# Patient Record
Sex: FEMALE | Race: WHITE | Employment: OTHER | URBAN - METROPOLITAN AREA
[De-identification: names, ages, dates, MRNs, and addresses within clinical notes are randomized per-mention and may not be internally consistent; named-entity substitution may affect disease eponyms.]

---

## 2017-04-17 PROBLEM — J45.20 MILD INTERMITTENT ASTHMA WITHOUT COMPLICATION (HCC): Status: ACTIVE | Noted: 2017-04-17

## 2017-04-17 PROBLEM — J45.20 MILD INTERMITTENT ASTHMA WITHOUT COMPLICATION: Status: ACTIVE | Noted: 2017-04-17

## 2017-05-15 PROCEDURE — 87086 URINE CULTURE/COLONY COUNT: CPT | Performed by: INTERNAL MEDICINE

## 2017-06-22 PROBLEM — K58.9 IRRITABLE BOWEL SYNDROME WITHOUT DIARRHEA: Status: ACTIVE | Noted: 2017-06-22

## 2017-12-19 PROBLEM — Z13.820 SCREENING FOR OSTEOPOROSIS: Status: ACTIVE | Noted: 2017-12-19

## 2017-12-29 PROBLEM — M54.16 LUMBAR RADICULOPATHY: Status: ACTIVE | Noted: 2017-12-29

## 2017-12-29 PROBLEM — G89.29 CHRONIC LEFT HIP PAIN: Status: ACTIVE | Noted: 2017-12-29

## 2017-12-29 PROBLEM — M25.552 CHRONIC LEFT HIP PAIN: Status: ACTIVE | Noted: 2017-12-29

## 2017-12-29 PROBLEM — M54.2 CERVICALGIA: Status: ACTIVE | Noted: 2017-12-29

## 2018-05-17 PROCEDURE — 88305 TISSUE EXAM BY PATHOLOGIST: CPT | Performed by: RADIOLOGY

## 2019-07-25 PROCEDURE — 88305 TISSUE EXAM BY PATHOLOGIST: CPT | Performed by: INTERNAL MEDICINE

## 2019-08-21 PROBLEM — G89.29 CHRONIC LEFT HIP PAIN: Status: RESOLVED | Noted: 2017-12-29 | Resolved: 2019-08-21

## 2019-08-21 PROBLEM — M54.2 CERVICALGIA: Status: RESOLVED | Noted: 2017-12-29 | Resolved: 2019-08-21

## 2019-08-21 PROBLEM — M25.552 CHRONIC LEFT HIP PAIN: Status: RESOLVED | Noted: 2017-12-29 | Resolved: 2019-08-21

## 2019-08-21 PROBLEM — J45.20 MILD INTERMITTENT ASTHMA WITHOUT COMPLICATION (HCC): Status: RESOLVED | Noted: 2017-04-17 | Resolved: 2019-08-21

## 2019-08-21 PROBLEM — K58.9 IRRITABLE BOWEL SYNDROME WITHOUT DIARRHEA: Status: RESOLVED | Noted: 2017-06-22 | Resolved: 2019-08-21

## 2019-08-21 PROBLEM — J45.20 MILD INTERMITTENT ASTHMA WITHOUT COMPLICATION: Status: RESOLVED | Noted: 2017-04-17 | Resolved: 2019-08-21

## 2019-10-25 PROBLEM — H90.3 SENSORINEURAL HEARING LOSS (SNHL), BILATERAL: Status: ACTIVE | Noted: 2019-10-25

## 2020-02-25 PROBLEM — M47.816 ARTHRITIS OF FACET JOINT OF LUMBAR SPINE: Status: ACTIVE | Noted: 2020-02-25

## 2020-02-25 PROBLEM — I70.0 AORTIC ATHEROSCLEROSIS (HCC): Status: ACTIVE | Noted: 2020-02-25

## 2020-02-25 PROBLEM — I70.0 AORTIC ATHEROSCLEROSIS: Status: ACTIVE | Noted: 2020-02-25

## 2020-02-25 PROBLEM — M24.28 LIGAMENTUM FLAVUM HYPERTROPHY: Status: ACTIVE | Noted: 2020-02-25

## 2020-02-25 PROBLEM — M54.16 LUMBAR RADICULOPATHY: Status: RESOLVED | Noted: 2017-12-29 | Resolved: 2020-02-25

## 2021-02-12 ENCOUNTER — APPOINTMENT (OUTPATIENT)
Dept: GENERAL RADIOLOGY | Age: 76
DRG: 336 | End: 2021-02-12
Attending: EMERGENCY MEDICINE
Payer: MEDICARE

## 2021-02-12 ENCOUNTER — APPOINTMENT (OUTPATIENT)
Dept: CT IMAGING | Age: 76
DRG: 336 | End: 2021-02-12
Attending: PHYSICIAN ASSISTANT
Payer: MEDICARE

## 2021-02-12 ENCOUNTER — HOSPITAL ENCOUNTER (INPATIENT)
Facility: HOSPITAL | Age: 76
LOS: 6 days | Discharge: HOME | DRG: 336 | End: 2021-02-18
Attending: EMERGENCY MEDICINE | Admitting: HOSPITALIST
Payer: MEDICARE

## 2021-02-12 DIAGNOSIS — R11.2 INTRACTABLE VOMITING WITH NAUSEA, UNSPECIFIED VOMITING TYPE: ICD-10-CM

## 2021-02-12 DIAGNOSIS — E86.0 DEHYDRATION: ICD-10-CM

## 2021-02-12 DIAGNOSIS — E87.1 HYPONATREMIA: ICD-10-CM

## 2021-02-12 DIAGNOSIS — R73.9 HYPERGLYCEMIA: ICD-10-CM

## 2021-02-12 DIAGNOSIS — K56.609 SMALL BOWEL OBSTRUCTION (HCC): Primary | ICD-10-CM

## 2021-02-12 LAB
ALBUMIN SERPL-MCNC: 4.1 G/DL (ref 3.4–5)
ALBUMIN/GLOB SERPL: 1.1 {RATIO} (ref 1–2)
ALP LIVER SERPL-CCNC: 67 U/L
ALT SERPL-CCNC: 21 U/L
ANION GAP SERPL CALC-SCNC: 9 MMOL/L (ref 0–18)
AST SERPL-CCNC: 25 U/L (ref 15–37)
ATRIAL RATE: 94 BPM
BASOPHILS # BLD AUTO: 0.03 X10(3) UL (ref 0–0.2)
BASOPHILS NFR BLD AUTO: 0.3 %
BILIRUB SERPL-MCNC: 0.6 MG/DL (ref 0.1–2)
BUN BLD-MCNC: 18 MG/DL (ref 7–18)
BUN/CREAT SERPL: 17.5 (ref 10–20)
CALCIUM BLD-MCNC: 10 MG/DL (ref 8.5–10.1)
CHLORIDE SERPL-SCNC: 98 MMOL/L (ref 98–112)
CLARITY UR REFRACT.AUTO: CLEAR
CO2 SERPL-SCNC: 25 MMOL/L (ref 21–32)
COLOR UR AUTO: YELLOW
CREAT BLD-MCNC: 1.03 MG/DL
DEPRECATED RDW RBC AUTO: 39.9 FL (ref 35.1–46.3)
EOSINOPHIL # BLD AUTO: 0.02 X10(3) UL (ref 0–0.7)
EOSINOPHIL NFR BLD AUTO: 0.2 %
ERYTHROCYTE [DISTWIDTH] IN BLOOD BY AUTOMATED COUNT: 12.3 % (ref 11–15)
GLOBULIN PLAS-MCNC: 3.8 G/DL (ref 2.8–4.4)
GLUCOSE BLD-MCNC: 130 MG/DL (ref 70–99)
GLUCOSE BLD-MCNC: 97 MG/DL (ref 70–99)
GLUCOSE UR STRIP.AUTO-MCNC: NEGATIVE MG/DL
HCT VFR BLD AUTO: 41.9 %
HGB BLD-MCNC: 14.2 G/DL
IMM GRANULOCYTES # BLD AUTO: 0.03 X10(3) UL (ref 0–1)
IMM GRANULOCYTES NFR BLD: 0.3 %
KETONES UR STRIP.AUTO-MCNC: >=160 MG/DL
LEUKOCYTE ESTERASE UR QL STRIP.AUTO: NEGATIVE
LIPASE SERPL-CCNC: 225 U/L (ref 73–393)
LYMPHOCYTES # BLD AUTO: 1.15 X10(3) UL (ref 1–4)
LYMPHOCYTES NFR BLD AUTO: 10.1 %
M PROTEIN MFR SERPL ELPH: 7.9 G/DL (ref 6.4–8.2)
MCH RBC QN AUTO: 30.1 PG (ref 26–34)
MCHC RBC AUTO-ENTMCNC: 33.9 G/DL (ref 31–37)
MCV RBC AUTO: 88.8 FL
MONOCYTES # BLD AUTO: 0.65 X10(3) UL (ref 0.1–1)
MONOCYTES NFR BLD AUTO: 5.7 %
NEUTROPHILS # BLD AUTO: 9.49 X10 (3) UL (ref 1.5–7.7)
NEUTROPHILS # BLD AUTO: 9.49 X10(3) UL (ref 1.5–7.7)
NEUTROPHILS NFR BLD AUTO: 83.4 %
NITRITE UR QL STRIP.AUTO: NEGATIVE
OSMOLALITY SERPL CALC.SUM OF ELEC: 278 MOSM/KG (ref 275–295)
P AXIS: 71 DEGREES
P-R INTERVAL: 134 MS
PH UR STRIP.AUTO: 6 [PH] (ref 4.5–8)
PLATELET # BLD AUTO: 361 10(3)UL (ref 150–450)
POTASSIUM SERPL-SCNC: 3.8 MMOL/L (ref 3.5–5.1)
PROT UR STRIP.AUTO-MCNC: NEGATIVE MG/DL
Q-T INTERVAL: 360 MS
QRS DURATION: 80 MS
QTC CALCULATION (BEZET): 450 MS
R AXIS: 7 DEGREES
RBC # BLD AUTO: 4.72 X10(6)UL
RBC UR QL AUTO: NEGATIVE
SARS-COV-2 RNA RESP QL NAA+PROBE: NOT DETECTED
SODIUM SERPL-SCNC: 132 MMOL/L (ref 136–145)
SP GR UR STRIP.AUTO: 1.01 (ref 1–1.03)
T AXIS: 55 DEGREES
UROBILINOGEN UR STRIP.AUTO-MCNC: 0.2 MG/DL
VENTRICULAR RATE: 94 BPM
WBC # BLD AUTO: 11.4 X10(3) UL (ref 4–11)

## 2021-02-12 PROCEDURE — 96375 TX/PRO/DX INJ NEW DRUG ADDON: CPT

## 2021-02-12 PROCEDURE — 96376 TX/PRO/DX INJ SAME DRUG ADON: CPT

## 2021-02-12 PROCEDURE — 96374 THER/PROPH/DIAG INJ IV PUSH: CPT

## 2021-02-12 PROCEDURE — 99285 EMERGENCY DEPT VISIT HI MDM: CPT

## 2021-02-12 PROCEDURE — 71045 X-RAY EXAM CHEST 1 VIEW: CPT | Performed by: EMERGENCY MEDICINE

## 2021-02-12 PROCEDURE — 80053 COMPREHEN METABOLIC PANEL: CPT | Performed by: PHYSICIAN ASSISTANT

## 2021-02-12 PROCEDURE — 96361 HYDRATE IV INFUSION ADD-ON: CPT

## 2021-02-12 PROCEDURE — 93010 ELECTROCARDIOGRAM REPORT: CPT

## 2021-02-12 PROCEDURE — 85025 COMPLETE CBC W/AUTO DIFF WBC: CPT | Performed by: PHYSICIAN ASSISTANT

## 2021-02-12 PROCEDURE — 81003 URINALYSIS AUTO W/O SCOPE: CPT | Performed by: PHYSICIAN ASSISTANT

## 2021-02-12 PROCEDURE — S0028 INJECTION, FAMOTIDINE, 20 MG: HCPCS | Performed by: PHYSICIAN ASSISTANT

## 2021-02-12 PROCEDURE — 74177 CT ABD & PELVIS W/CONTRAST: CPT | Performed by: PHYSICIAN ASSISTANT

## 2021-02-12 PROCEDURE — 93005 ELECTROCARDIOGRAM TRACING: CPT

## 2021-02-12 PROCEDURE — 83690 ASSAY OF LIPASE: CPT | Performed by: PHYSICIAN ASSISTANT

## 2021-02-12 PROCEDURE — 82962 GLUCOSE BLOOD TEST: CPT

## 2021-02-12 RX ORDER — PANTOPRAZOLE SODIUM 20 MG/1
20 TABLET, DELAYED RELEASE ORAL
Refills: 3 | Status: DISCONTINUED | OUTPATIENT
Start: 2021-02-13 | End: 2021-02-14

## 2021-02-12 RX ORDER — HYDROMORPHONE HYDROCHLORIDE 1 MG/ML
0.5 INJECTION, SOLUTION INTRAMUSCULAR; INTRAVENOUS; SUBCUTANEOUS EVERY 30 MIN PRN
Status: ACTIVE | OUTPATIENT
Start: 2021-02-12 | End: 2021-02-12

## 2021-02-12 RX ORDER — ALBUTEROL SULFATE 90 UG/1
2 AEROSOL, METERED RESPIRATORY (INHALATION) EVERY 6 HOURS
Status: DISCONTINUED | OUTPATIENT
Start: 2021-02-12 | End: 2021-02-17

## 2021-02-12 RX ORDER — SODIUM CHLORIDE 9 MG/ML
INJECTION, SOLUTION INTRAVENOUS CONTINUOUS
Status: ACTIVE | OUTPATIENT
Start: 2021-02-12 | End: 2021-02-12

## 2021-02-12 RX ORDER — METOCLOPRAMIDE HYDROCHLORIDE 5 MG/ML
10 INJECTION INTRAMUSCULAR; INTRAVENOUS ONCE
Status: COMPLETED | OUTPATIENT
Start: 2021-02-12 | End: 2021-02-12

## 2021-02-12 RX ORDER — MORPHINE SULFATE 2 MG/ML
2 INJECTION, SOLUTION INTRAMUSCULAR; INTRAVENOUS EVERY 2 HOUR PRN
Status: DISCONTINUED | OUTPATIENT
Start: 2021-02-12 | End: 2021-02-18

## 2021-02-12 RX ORDER — DIPHENHYDRAMINE HYDROCHLORIDE 50 MG/ML
25 INJECTION INTRAMUSCULAR; INTRAVENOUS ONCE
Status: COMPLETED | OUTPATIENT
Start: 2021-02-12 | End: 2021-02-12

## 2021-02-12 RX ORDER — ONDANSETRON 2 MG/ML
4 INJECTION INTRAMUSCULAR; INTRAVENOUS ONCE
Status: COMPLETED | OUTPATIENT
Start: 2021-02-12 | End: 2021-02-12

## 2021-02-12 RX ORDER — METOCLOPRAMIDE HYDROCHLORIDE 5 MG/ML
5 INJECTION INTRAMUSCULAR; INTRAVENOUS EVERY 8 HOURS PRN
Status: DISCONTINUED | OUTPATIENT
Start: 2021-02-12 | End: 2021-02-18

## 2021-02-12 RX ORDER — ONDANSETRON 2 MG/ML
4 INJECTION INTRAMUSCULAR; INTRAVENOUS EVERY 4 HOURS PRN
Status: DISCONTINUED | OUTPATIENT
Start: 2021-02-12 | End: 2021-02-12

## 2021-02-12 RX ORDER — ONDANSETRON 2 MG/ML
4 INJECTION INTRAMUSCULAR; INTRAVENOUS EVERY 6 HOURS PRN
Status: DISCONTINUED | OUTPATIENT
Start: 2021-02-12 | End: 2021-02-18

## 2021-02-12 RX ORDER — FAMOTIDINE 10 MG/ML
20 INJECTION, SOLUTION INTRAVENOUS ONCE
Status: COMPLETED | OUTPATIENT
Start: 2021-02-12 | End: 2021-02-12

## 2021-02-12 RX ORDER — MONTELUKAST SODIUM 10 MG/1
10 TABLET ORAL EVERY EVENING
Status: DISCONTINUED | OUTPATIENT
Start: 2021-02-12 | End: 2021-02-18

## 2021-02-12 RX ORDER — SODIUM CHLORIDE 9 MG/ML
INJECTION, SOLUTION INTRAVENOUS CONTINUOUS
Status: DISCONTINUED | OUTPATIENT
Start: 2021-02-12 | End: 2021-02-14

## 2021-02-12 RX ORDER — MORPHINE SULFATE 4 MG/ML
4 INJECTION, SOLUTION INTRAMUSCULAR; INTRAVENOUS EVERY 2 HOUR PRN
Status: DISCONTINUED | OUTPATIENT
Start: 2021-02-12 | End: 2021-02-18

## 2021-02-12 RX ORDER — MORPHINE SULFATE 2 MG/ML
1 INJECTION, SOLUTION INTRAMUSCULAR; INTRAVENOUS EVERY 2 HOUR PRN
Status: DISCONTINUED | OUTPATIENT
Start: 2021-02-12 | End: 2021-02-18

## 2021-02-12 RX ORDER — MORPHINE SULFATE 4 MG/ML
4 INJECTION, SOLUTION INTRAMUSCULAR; INTRAVENOUS ONCE
Status: DISCONTINUED | OUTPATIENT
Start: 2021-02-12 | End: 2021-02-12

## 2021-02-12 RX ORDER — MORPHINE SULFATE 4 MG/ML
4 INJECTION, SOLUTION INTRAMUSCULAR; INTRAVENOUS ONCE
Status: COMPLETED | OUTPATIENT
Start: 2021-02-12 | End: 2021-02-12

## 2021-02-12 RX ORDER — HEPARIN SODIUM 5000 [USP'U]/ML
5000 INJECTION, SOLUTION INTRAVENOUS; SUBCUTANEOUS EVERY 8 HOURS SCHEDULED
Status: DISCONTINUED | OUTPATIENT
Start: 2021-02-12 | End: 2021-02-14

## 2021-02-12 NOTE — H&P
[facility] Hospitalist History and Physical      CC: Abd pain    PCP: Mimi Sultana MD    History of Present Illness: Patient is a 67year old female with PMH sig for HTN, GERD presented to ER with abd pain. CT consistent with SBO. NG placed.      Afebil every 6 (six) hours as needed for Wheezing., Disp: 1 Box, Rfl: 0    •  Albuterol Sulfate HFA (VENTOLIN HFA) 108 (90 Base) MCG/ACT Inhalation Aero Soln, TAKE 2 PUFFS EVERY 6 HOURS AS NEEDED FOR WHEEZING (Patient not taking: Reported on 8/25/2020 ), Disp: 1 25.0 02/12/2021     02/12/2021    CA 10.0 02/12/2021    ALB 4.1 02/12/2021    ALKPHO 67 02/12/2021    BILT 0.6 02/12/2021    TP 7.9 02/12/2021    AST 25 02/12/2021    ALT 21 02/12/2021     02/12/2021       Above labs reviewed.     Radiology:

## 2021-02-12 NOTE — PROGRESS NOTES
[facility] Pharmacy Note:  Renal Dose Adjustment for Metoclopramide (REGLAN)    Jazlyn Tran has been prescribed Metoclopramide (REGLAN) 10 mg every 8 hours as needed for n/v.    Estimated Creatinine Clearance: 36.2 mL/min (A) (based on SCr of 1.03 mg/dL (H)

## 2021-02-12 NOTE — PLAN OF CARE
NURSING ADMISSION NOTE      Patient admitted via Ambulance  Oriented to room. Safety precautions initiated. Bed in low position. Call light in reach. Pt brought in from [facility]. Admission navigator completed. Admission assessment done.

## 2021-02-12 NOTE — ED PROVIDER NOTES
14-year-old female who was seen by me as well as by the physician assistant with complaints of vomiting abdominal distention and bloating.   Patient was recently at [facility] secondary to severe abdominal pain that began after starting meloxicam

## 2021-02-12 NOTE — ED INITIAL ASSESSMENT (HPI)
C/o abd pain since  0330 Wed, admitted to [facility] for adverse reaction to meloxicam, emesis starting this am 0200

## 2021-02-12 NOTE — ED PROVIDER NOTES
Patient Seen in: [facility] Emergency Department In [place]      History   Patient presents with:  Nausea/Vomiting/Diarrhea  Abdomen/Flank Pain    Stated Complaint: vomiting, abdominal pain    HPI/Subjective:   HPI    CHIEF COMPLAINT: Vomiting and abdomin reviewed and is noncontributory to the presenting problem, except as indicated as above.     Objective:   Past Medical History:   Diagnosis Date   • Arthritis    • Esophageal reflux    • Extrinsic asthma, unspecified    • Hiatal hernia    • Unspecified esse Notable for the following components:       Result Value    Glucose 130 (*)     Sodium 132 (*)     Creatinine 1.03 (*)     GFR, Non- 53 (*)     All other components within normal limits   URINALYSIS WITH CULTURE REFLEX - Abnormal; Notable f transcribed by Technologist)  Mid abd pain with emesis for 3 days after taking new medication Meloxicam per patient. CONTRAST USED:  94cc of Omnipaque 350  FINDINGS:  LIVER:  No enlargement, atrophy, abnormal density, or significant focal lesion.   BILIAR showed a white count of 11,000. Lipase was normal.  Urinalysis showed ketones. She is given an IV fluid bolus, morphine, Pepcid and Zofran. She was sent for CT abdomen and pelvis.   CT abdomen and pelvis reveals a partial small bowel obstruction likely d

## 2021-02-13 ENCOUNTER — ANESTHESIA (OUTPATIENT)
Dept: SURGERY | Facility: HOSPITAL | Age: 76
DRG: 336 | End: 2021-02-13
Payer: MEDICARE

## 2021-02-13 ENCOUNTER — ANESTHESIA EVENT (OUTPATIENT)
Dept: SURGERY | Facility: HOSPITAL | Age: 76
DRG: 336 | End: 2021-02-13
Payer: MEDICARE

## 2021-02-13 LAB
ANION GAP SERPL CALC-SCNC: 9 MMOL/L (ref 0–18)
ANTIBODY SCREEN: NEGATIVE
BASOPHILS # BLD AUTO: 0.02 X10(3) UL (ref 0–0.2)
BASOPHILS NFR BLD AUTO: 0.4 %
BUN BLD-MCNC: 19 MG/DL (ref 7–18)
BUN/CREAT SERPL: 21.6 (ref 10–20)
CALCIUM BLD-MCNC: 8.2 MG/DL (ref 8.5–10.1)
CHLORIDE SERPL-SCNC: 106 MMOL/L (ref 98–112)
CO2 SERPL-SCNC: 27 MMOL/L (ref 21–32)
CREAT BLD-MCNC: 0.88 MG/DL
DEPRECATED RDW RBC AUTO: 43.5 FL (ref 35.1–46.3)
EOSINOPHIL # BLD AUTO: 0.06 X10(3) UL (ref 0–0.7)
EOSINOPHIL NFR BLD AUTO: 1.2 %
ERYTHROCYTE [DISTWIDTH] IN BLOOD BY AUTOMATED COUNT: 12.7 % (ref 11–15)
GLUCOSE BLD-MCNC: 81 MG/DL (ref 70–99)
GLUCOSE BLD-MCNC: 86 MG/DL (ref 70–99)
HCT VFR BLD AUTO: 35.6 %
HGB BLD-MCNC: 11.5 G/DL
IMM GRANULOCYTES # BLD AUTO: 0.01 X10(3) UL (ref 0–1)
IMM GRANULOCYTES NFR BLD: 0.2 %
LYMPHOCYTES # BLD AUTO: 1.16 X10(3) UL (ref 1–4)
LYMPHOCYTES NFR BLD AUTO: 22.6 %
MCH RBC QN AUTO: 30.2 PG (ref 26–34)
MCHC RBC AUTO-ENTMCNC: 32.3 G/DL (ref 31–37)
MCV RBC AUTO: 93.4 FL
MONOCYTES # BLD AUTO: 0.62 X10(3) UL (ref 0.1–1)
MONOCYTES NFR BLD AUTO: 12.1 %
NEUTROPHILS # BLD AUTO: 3.26 X10 (3) UL (ref 1.5–7.7)
NEUTROPHILS # BLD AUTO: 3.26 X10(3) UL (ref 1.5–7.7)
NEUTROPHILS NFR BLD AUTO: 63.5 %
OSMOLALITY SERPL CALC.SUM OF ELEC: 295 MOSM/KG (ref 275–295)
PLATELET # BLD AUTO: 280 10(3)UL (ref 150–450)
POTASSIUM SERPL-SCNC: 3.7 MMOL/L (ref 3.5–5.1)
RBC # BLD AUTO: 3.81 X10(6)UL
RH BLOOD TYPE: POSITIVE
SODIUM SERPL-SCNC: 142 MMOL/L (ref 136–145)
WBC # BLD AUTO: 5.1 X10(3) UL (ref 4–11)

## 2021-02-13 PROCEDURE — 86901 BLOOD TYPING SEROLOGIC RH(D): CPT | Performed by: SURGERY

## 2021-02-13 PROCEDURE — 0DN80ZZ RELEASE SMALL INTESTINE, OPEN APPROACH: ICD-10-PCS | Performed by: SURGERY

## 2021-02-13 PROCEDURE — 82962 GLUCOSE BLOOD TEST: CPT

## 2021-02-13 PROCEDURE — 80048 BASIC METABOLIC PNL TOTAL CA: CPT | Performed by: HOSPITALIST

## 2021-02-13 PROCEDURE — 86850 RBC ANTIBODY SCREEN: CPT | Performed by: SURGERY

## 2021-02-13 PROCEDURE — 85025 COMPLETE CBC W/AUTO DIFF WBC: CPT | Performed by: HOSPITALIST

## 2021-02-13 PROCEDURE — 86900 BLOOD TYPING SEROLOGIC ABO: CPT | Performed by: SURGERY

## 2021-02-13 PROCEDURE — S0028 INJECTION, FAMOTIDINE, 20 MG: HCPCS

## 2021-02-13 DEVICE — SEPRAFILM ADHESION BARRIER (MEMBRANE) IS A STERILE, BIORESORBABLE, TRANSLUCENT ADHESION BARRIER COMPOSED OF TWO ANIONIC POLYSACCHARIDES, SODIUM HYALURONATE (HA) AND CARBOXYMETHYLCELLULOSE (CMC).
Type: IMPLANTABLE DEVICE | Site: ABDOMEN | Status: FUNCTIONAL
Brand: SEPRAFILM

## 2021-02-13 RX ORDER — EPHEDRINE SULFATE 50 MG/ML
INJECTION INTRAVENOUS AS NEEDED
Status: DISCONTINUED | OUTPATIENT
Start: 2021-02-13 | End: 2021-02-13 | Stop reason: SURG

## 2021-02-13 RX ORDER — DEXAMETHASONE SODIUM PHOSPHATE 4 MG/ML
VIAL (ML) INJECTION AS NEEDED
Status: DISCONTINUED | OUTPATIENT
Start: 2021-02-13 | End: 2021-02-13 | Stop reason: SURG

## 2021-02-13 RX ORDER — HYDROMORPHONE HYDROCHLORIDE 1 MG/ML
0.2 INJECTION, SOLUTION INTRAMUSCULAR; INTRAVENOUS; SUBCUTANEOUS EVERY 2 HOUR PRN
Status: DISCONTINUED | OUTPATIENT
Start: 2021-02-13 | End: 2021-02-18

## 2021-02-13 RX ORDER — LIDOCAINE HYDROCHLORIDE 10 MG/ML
INJECTION, SOLUTION INFILTRATION; PERINEURAL AS NEEDED
Status: DISCONTINUED | OUTPATIENT
Start: 2021-02-13 | End: 2021-02-13 | Stop reason: HOSPADM

## 2021-02-13 RX ORDER — SODIUM CHLORIDE 9 MG/ML
INJECTION, SOLUTION INTRAVENOUS CONTINUOUS
Status: DISCONTINUED | OUTPATIENT
Start: 2021-02-13 | End: 2021-02-13 | Stop reason: HOSPADM

## 2021-02-13 RX ORDER — BACITRACIN 50000 [USP'U]/1
INJECTION, POWDER, LYOPHILIZED, FOR SOLUTION INTRAMUSCULAR AS NEEDED
Status: DISCONTINUED | OUTPATIENT
Start: 2021-02-13 | End: 2021-02-13 | Stop reason: HOSPADM

## 2021-02-13 RX ORDER — LIDOCAINE HYDROCHLORIDE 10 MG/ML
INJECTION, SOLUTION EPIDURAL; INFILTRATION; INTRACAUDAL; PERINEURAL AS NEEDED
Status: DISCONTINUED | OUTPATIENT
Start: 2021-02-13 | End: 2021-02-13 | Stop reason: SURG

## 2021-02-13 RX ORDER — LIDOCAINE HYDROCHLORIDE ANHYDROUS AND DEXTROSE MONOHYDRATE .8; 5 G/100ML; G/100ML
INJECTION, SOLUTION INTRAVENOUS CONTINUOUS PRN
Status: DISCONTINUED | OUTPATIENT
Start: 2021-02-13 | End: 2021-02-13 | Stop reason: SURG

## 2021-02-13 RX ORDER — LABETALOL HYDROCHLORIDE 5 MG/ML
5 INJECTION, SOLUTION INTRAVENOUS EVERY 5 MIN PRN
Status: DISCONTINUED | OUTPATIENT
Start: 2021-02-13 | End: 2021-02-13 | Stop reason: HOSPADM

## 2021-02-13 RX ORDER — BUPIVACAINE HYDROCHLORIDE 5 MG/ML
INJECTION, SOLUTION EPIDURAL; INTRACAUDAL AS NEEDED
Status: DISCONTINUED | OUTPATIENT
Start: 2021-02-13 | End: 2021-02-13 | Stop reason: HOSPADM

## 2021-02-13 RX ORDER — GLYCOPYRROLATE 0.2 MG/ML
INJECTION, SOLUTION INTRAMUSCULAR; INTRAVENOUS AS NEEDED
Status: DISCONTINUED | OUTPATIENT
Start: 2021-02-13 | End: 2021-02-13 | Stop reason: SURG

## 2021-02-13 RX ORDER — MEPERIDINE HYDROCHLORIDE 25 MG/ML
12.5 INJECTION INTRAMUSCULAR; INTRAVENOUS; SUBCUTANEOUS AS NEEDED
Status: DISCONTINUED | OUTPATIENT
Start: 2021-02-13 | End: 2021-02-13 | Stop reason: HOSPADM

## 2021-02-13 RX ORDER — CEFOXITIN 2 G/1
INJECTION, POWDER, FOR SOLUTION INTRAVENOUS AS NEEDED
Status: DISCONTINUED | OUTPATIENT
Start: 2021-02-13 | End: 2021-02-13 | Stop reason: SURG

## 2021-02-13 RX ORDER — FAMOTIDINE 10 MG/ML
INJECTION, SOLUTION INTRAVENOUS AS NEEDED
Status: DISCONTINUED | OUTPATIENT
Start: 2021-02-13 | End: 2021-02-13 | Stop reason: SURG

## 2021-02-13 RX ORDER — NALOXONE HYDROCHLORIDE 0.4 MG/ML
80 INJECTION, SOLUTION INTRAMUSCULAR; INTRAVENOUS; SUBCUTANEOUS AS NEEDED
Status: DISCONTINUED | OUTPATIENT
Start: 2021-02-13 | End: 2021-02-13 | Stop reason: HOSPADM

## 2021-02-13 RX ORDER — KETAMINE HYDROCHLORIDE 50 MG/ML
INJECTION, SOLUTION, CONCENTRATE INTRAMUSCULAR; INTRAVENOUS AS NEEDED
Status: DISCONTINUED | OUTPATIENT
Start: 2021-02-13 | End: 2021-02-13 | Stop reason: SURG

## 2021-02-13 RX ORDER — NEOSTIGMINE METHYLSULFATE 1 MG/ML
INJECTION INTRAVENOUS AS NEEDED
Status: DISCONTINUED | OUTPATIENT
Start: 2021-02-13 | End: 2021-02-13 | Stop reason: SURG

## 2021-02-13 RX ORDER — CISATRACURIUM BESYLATE 2 MG/ML
INJECTION INTRAVENOUS AS NEEDED
Status: DISCONTINUED | OUTPATIENT
Start: 2021-02-13 | End: 2021-02-13 | Stop reason: SURG

## 2021-02-13 RX ORDER — HYDROMORPHONE HYDROCHLORIDE 1 MG/ML
0.8 INJECTION, SOLUTION INTRAMUSCULAR; INTRAVENOUS; SUBCUTANEOUS EVERY 2 HOUR PRN
Status: DISCONTINUED | OUTPATIENT
Start: 2021-02-13 | End: 2021-02-18

## 2021-02-13 RX ORDER — KETOROLAC TROMETHAMINE 30 MG/ML
30 INJECTION, SOLUTION INTRAMUSCULAR; INTRAVENOUS EVERY 6 HOURS PRN
Status: DISCONTINUED | OUTPATIENT
Start: 2021-02-13 | End: 2021-02-14

## 2021-02-13 RX ORDER — HYDROMORPHONE HYDROCHLORIDE 1 MG/ML
0.4 INJECTION, SOLUTION INTRAMUSCULAR; INTRAVENOUS; SUBCUTANEOUS EVERY 2 HOUR PRN
Status: DISCONTINUED | OUTPATIENT
Start: 2021-02-13 | End: 2021-02-18

## 2021-02-13 RX ORDER — HYDROMORPHONE HYDROCHLORIDE 1 MG/ML
0.2 INJECTION, SOLUTION INTRAMUSCULAR; INTRAVENOUS; SUBCUTANEOUS EVERY 5 MIN PRN
Status: DISCONTINUED | OUTPATIENT
Start: 2021-02-13 | End: 2021-02-13 | Stop reason: HOSPADM

## 2021-02-13 RX ORDER — ONDANSETRON 2 MG/ML
4 INJECTION INTRAMUSCULAR; INTRAVENOUS AS NEEDED
Status: DISCONTINUED | OUTPATIENT
Start: 2021-02-13 | End: 2021-02-13 | Stop reason: HOSPADM

## 2021-02-13 RX ADMIN — ONDANSETRON 4 MG: 2 INJECTION INTRAMUSCULAR; INTRAVENOUS at 10:28:00

## 2021-02-13 RX ADMIN — FAMOTIDINE 20 MG: 10 INJECTION, SOLUTION INTRAVENOUS at 09:52:00

## 2021-02-13 RX ADMIN — LIDOCAINE HYDROCHLORIDE ANHYDROUS AND DEXTROSE MONOHYDRATE 2 MG/KG/HR: .8; 5 INJECTION, SOLUTION INTRAVENOUS at 09:50:00

## 2021-02-13 RX ADMIN — NEOSTIGMINE METHYLSULFATE 5 MG: 1 INJECTION INTRAVENOUS at 10:32:00

## 2021-02-13 RX ADMIN — CISATRACURIUM BESYLATE 4 MG: 2 INJECTION INTRAVENOUS at 10:00:00

## 2021-02-13 RX ADMIN — KETAMINE HYDROCHLORIDE 20 MG: 50 INJECTION, SOLUTION, CONCENTRATE INTRAMUSCULAR; INTRAVENOUS at 09:46:00

## 2021-02-13 RX ADMIN — GLYCOPYRROLATE 0.6 MG: 0.2 INJECTION, SOLUTION INTRAMUSCULAR; INTRAVENOUS at 10:32:00

## 2021-02-13 RX ADMIN — METOCLOPRAMIDE HYDROCHLORIDE 10 MG: 5 INJECTION INTRAMUSCULAR; INTRAVENOUS at 09:52:00

## 2021-02-13 RX ADMIN — EPHEDRINE SULFATE 10 MG: 50 INJECTION INTRAVENOUS at 10:12:00

## 2021-02-13 RX ADMIN — GLYCOPYRROLATE 0.2 MG: 0.2 INJECTION, SOLUTION INTRAMUSCULAR; INTRAVENOUS at 10:12:00

## 2021-02-13 RX ADMIN — EPHEDRINE SULFATE 10 MG: 50 INJECTION INTRAVENOUS at 10:11:00

## 2021-02-13 RX ADMIN — SODIUM CHLORIDE: 9 INJECTION, SOLUTION INTRAVENOUS at 10:46:00

## 2021-02-13 RX ADMIN — LIDOCAINE HYDROCHLORIDE 100 MG: 10 INJECTION, SOLUTION EPIDURAL; INFILTRATION; INTRACAUDAL; PERINEURAL at 09:45:00

## 2021-02-13 RX ADMIN — DEXAMETHASONE SODIUM PHOSPHATE 8 MG: 4 MG/ML VIAL (ML) INJECTION at 09:51:00

## 2021-02-13 RX ADMIN — CISATRACURIUM BESYLATE 2 MG: 2 INJECTION INTRAVENOUS at 09:46:00

## 2021-02-13 RX ADMIN — CEFOXITIN 2 G: 2 INJECTION, POWDER, FOR SOLUTION INTRAVENOUS at 09:54:00

## 2021-02-13 NOTE — BRIEF OP NOTE
Pre-Operative Diagnosis: Bowel obstruction ([address] [K56.609]     Post-Operative Diagnosis: Bowel obstruction ([address] [K56.609]      Procedure Performed:   Procedure(s):  EXPLORATORY LAPAROTOMY , LYSIS OF ADHESIONS    Surgeon(s) and Role:     Roseann Baca,

## 2021-02-13 NOTE — ANESTHESIA POSTPROCEDURE EVALUATION
[address] Patient Status:  Inpatient   Age/Gender 67year old female MRN [id number]   Location [address] Attending [facility] Day # 1 PCP MD Yoselyn Medel

## 2021-02-13 NOTE — ANESTHESIA PREPROCEDURE EVALUATION
PRE-OP EVALUATION    Patient Name: Liliana Sood    Pre-op Diagnosis: Bowel obstruction ([address] [C16.245]    Procedure(s):  EXPLORATORY LAPAROTOMY  POSS.  BOWEL RESECTION POSS. OSTOMY    Surgeon(s) and Role:     Inez Huddleston MD - Primary    Pre-op vi •  Metoclopramide HCl (REGLAN) injection 5 mg, 5 mg, Intravenous, Q8H PRN        Outpatient Medications:     •  Meloxicam 7.5 MG Oral Tab, Take 1 tablet (7.5 mg total) by mouth daily. , Disp: 30 tablet, Rfl: 0, 2/12/2021 at Unknown time    •  Montelukast So (-) history of anesthetic complications         GI/Hepatic/Renal  Comment: Bowel obstruction  Cr mildly elevated    (+) GERD   (+) hiatal hernia                        Cardiovascular      ECG reviewed.           (+) obesity  (+) hypertension   (+) hyperlipi NPO status verified and patient meets guidelines. Comment: General anesthesia risks, benefits and alternatives discussed.   Possible side effects/risks including but not limited to sore throat, oral or dental injury, eye injury, nausea/vomiting, adve

## 2021-02-13 NOTE — PLAN OF CARE
Pt A&Ox4 Room Air  Resting in bed  Meds given per Mar  Will hold morning Heparin. NGT to left iWlnere  NPO (SBO)  General surgery came and spoke to pt. Exploratory lap for SBO, Possible bowel resection  Consent signed on pt Chart.     Safety precaution main

## 2021-02-13 NOTE — ANESTHESIA PROCEDURE NOTES
Airway  Date/Time: 2/13/2021 9:47 AM  Urgency: elective      General Information and Staff    Patient location during procedure: OR  Anesthesiologist: Roger Aleman MD  Performed: anesthesiologist     Indications and Patient Condition  Indications for airway

## 2021-02-13 NOTE — ANESTHESIA PROCEDURE NOTES
Peripheral IV  Date/Time: 2/13/2021 9:50 AM  Inserted by: Elaine Goncalves MD    Placement  Needle size: 18 G  Laterality: left  Location: wrist  Local anesthetic: none  Site prep: alcohol  Technique: anatomical landmarks

## 2021-02-13 NOTE — CONSULTS
[facility]  Report of Consultation    Pat Susieisaias Patient Status:  Inpatient    1945 MRN [id number]   [facility] 3NE-A Attending Danielito Mosley,*   Hosp Day # 0 PCP Pierre Barron MD     [facility] SERVICES, [facility] Q6H  •  Montelukast Sodium (SINGULAIR) tab 10 mg, 10 mg, Oral, QPM  •  [START ON 2/13/2021] Pantoprazole Sodium (PROTONIX) EC tab 20 mg, 20 mg, Oral, QAM AC  •  0.9% NaCl infusion, , Intravenous, Continuous  •  Heparin Sodium (Porcine) 5000 UNIT/ML injecti turgor. Neurologic: Cranial nerves are grossly intact. Motor strength and sensory examination is grossly normal.  No focal neurologic deficit.     Laboratory Data:  Lab Results   Component Value Date    WBC 11.4 02/12/2021    HGB 14.2 02/12/2021    HCT 41

## 2021-02-14 LAB
ANION GAP SERPL CALC-SCNC: 9 MMOL/L (ref 0–18)
BASOPHILS # BLD AUTO: 0.01 X10(3) UL (ref 0–0.2)
BASOPHILS NFR BLD AUTO: 0.2 %
BUN BLD-MCNC: 18 MG/DL (ref 7–18)
BUN/CREAT SERPL: 17.6 (ref 10–20)
CALCIUM BLD-MCNC: 8.6 MG/DL (ref 8.5–10.1)
CHLORIDE SERPL-SCNC: 110 MMOL/L (ref 98–112)
CO2 SERPL-SCNC: 22 MMOL/L (ref 21–32)
CREAT BLD-MCNC: 1.02 MG/DL
DEPRECATED RDW RBC AUTO: 44 FL (ref 35.1–46.3)
EOSINOPHIL # BLD AUTO: 0 X10(3) UL (ref 0–0.7)
EOSINOPHIL NFR BLD AUTO: 0 %
ERYTHROCYTE [DISTWIDTH] IN BLOOD BY AUTOMATED COUNT: 12.8 % (ref 11–15)
GLUCOSE BLD-MCNC: 102 MG/DL (ref 70–99)
GLUCOSE BLD-MCNC: 103 MG/DL (ref 70–99)
GLUCOSE BLD-MCNC: 103 MG/DL (ref 70–99)
HCT VFR BLD AUTO: 33.8 %
HGB BLD-MCNC: 11 G/DL
IMM GRANULOCYTES # BLD AUTO: 0 X10(3) UL (ref 0–1)
IMM GRANULOCYTES NFR BLD: 0 %
LYMPHOCYTES # BLD AUTO: 0.87 X10(3) UL (ref 1–4)
LYMPHOCYTES NFR BLD AUTO: 16.8 %
MCH RBC QN AUTO: 30.4 PG (ref 26–34)
MCHC RBC AUTO-ENTMCNC: 32.5 G/DL (ref 31–37)
MCV RBC AUTO: 93.4 FL
MONOCYTES # BLD AUTO: 0.76 X10(3) UL (ref 0.1–1)
MONOCYTES NFR BLD AUTO: 14.6 %
NEUTROPHILS # BLD AUTO: 3.55 X10 (3) UL (ref 1.5–7.7)
NEUTROPHILS # BLD AUTO: 3.55 X10(3) UL (ref 1.5–7.7)
NEUTROPHILS NFR BLD AUTO: 68.4 %
OSMOLALITY SERPL CALC.SUM OF ELEC: 294 MOSM/KG (ref 275–295)
PLATELET # BLD AUTO: 261 10(3)UL (ref 150–450)
POTASSIUM SERPL-SCNC: 4.1 MMOL/L (ref 3.5–5.1)
POTASSIUM SERPL-SCNC: 4.1 MMOL/L (ref 3.5–5.1)
RBC # BLD AUTO: 3.62 X10(6)UL
SODIUM SERPL-SCNC: 141 MMOL/L (ref 136–145)
WBC # BLD AUTO: 5.2 X10(3) UL (ref 4–11)

## 2021-02-14 PROCEDURE — 80048 BASIC METABOLIC PNL TOTAL CA: CPT | Performed by: HOSPITALIST

## 2021-02-14 PROCEDURE — 84132 ASSAY OF SERUM POTASSIUM: CPT | Performed by: HOSPITALIST

## 2021-02-14 PROCEDURE — C9113 INJ PANTOPRAZOLE SODIUM, VIA: HCPCS | Performed by: HOSPITALIST

## 2021-02-14 PROCEDURE — 82962 GLUCOSE BLOOD TEST: CPT

## 2021-02-14 PROCEDURE — 85025 COMPLETE CBC W/AUTO DIFF WBC: CPT | Performed by: HOSPITALIST

## 2021-02-14 RX ORDER — KETOROLAC TROMETHAMINE 15 MG/ML
15 INJECTION, SOLUTION INTRAMUSCULAR; INTRAVENOUS EVERY 6 HOURS PRN
Status: DISPENSED | OUTPATIENT
Start: 2021-02-14 | End: 2021-02-15

## 2021-02-14 RX ORDER — HEPARIN SODIUM 5000 [USP'U]/ML
5000 INJECTION, SOLUTION INTRAVENOUS; SUBCUTANEOUS EVERY 12 HOURS SCHEDULED
Status: DISCONTINUED | OUTPATIENT
Start: 2021-02-14 | End: 2021-02-18

## 2021-02-14 RX ORDER — DEXTROSE AND SODIUM CHLORIDE 5; .45 G/100ML; G/100ML
INJECTION, SOLUTION INTRAVENOUS CONTINUOUS
Status: DISCONTINUED | OUTPATIENT
Start: 2021-02-14 | End: 2021-02-17

## 2021-02-14 RX ORDER — HYDRALAZINE HYDROCHLORIDE 20 MG/ML
10 INJECTION INTRAMUSCULAR; INTRAVENOUS EVERY 6 HOURS PRN
Status: DISCONTINUED | OUTPATIENT
Start: 2021-02-14 | End: 2021-02-18

## 2021-02-14 RX ORDER — DEXTROSE AND SODIUM CHLORIDE 5; .9 G/100ML; G/100ML
INJECTION, SOLUTION INTRAVENOUS CONTINUOUS
Status: DISCONTINUED | OUTPATIENT
Start: 2021-02-14 | End: 2021-02-14

## 2021-02-14 NOTE — PLAN OF CARE
Pt a&o x 4.   Cheerful & cooperative w/ care  Maintaining sats on ra.  encourage is & db&c  Ngt producing dark green fluid  States morphine provides adequate pain relief  Will remove vidal, per surgeon's instructions  Up in chair this am.            Problem Problem: GASTROINTESTINAL - ADULT  Goal: Minimal or absence of nausea and vomiting  Description: INTERVENTIONS:  - Maintain adequate hydration with IV or PO as ordered and tolerated  - Nasogastric tube to low intermittent suction as ordered  - Evaluate e symptoms of electrolyte imbalances  - Administer electrolyte replacement as ordered  - Monitor response to electrolyte replacements, including rhythm and repeat lab results as appropriate  - Fluid restriction as ordered  - Instruct patient on fluid and nut

## 2021-02-14 NOTE — PROGRESS NOTES
[facility]  Progress Note    Lula  Patient Status:  Inpatient    1945 MRN [id number]   [facility] 3NW-A Attending Mani Ashton,*   Hosp Day # 2 PCP Lester Castillo MD     Subjective:  Pain control.  Tisha Padron

## 2021-02-14 NOTE — PROGRESS NOTES
Pharmacy Note: Renal dose adjustment of Cefoxitin    Brook Rapp is a 67year old female who has been prescribed Cefoxitin 1 g  every 6 hours.   CrCl is 36.5 ml/min so the dose has been adjusted  to Cefoxitin 1 gram IV every 12 hours per hospital valentín

## 2021-02-14 NOTE — PROGRESS NOTES
Pt resting in bed, easy non labored breathing on ra. Vs wnl. Iv fluids infusing without difficulty. cpox and telemetry in place. Bilateral scd's in place. Pt tolerating ice chips. Ng to lis. Denies any nausea. Midline incision with adaqcel dressing c/d/i.

## 2021-02-14 NOTE — PROGRESS NOTES
[facility] Hospitalist Progress Note                                                                   [address]  1/20/1945    CC: FU SBO    Interval History:  - Doing well today, NG in agnes 12.7 12.8   NEPRELIM 9.49* 3.26 3.55   WBC 11.4* 5.1 5.2   .0 280.0 261.0       Recent Labs   Lab 02/12/21  1150 02/13/21  0814 02/14/21  0533   * 81 103*   BUN 18 19* 18   CREATSERUM 1.03* 0.88 1.02   GFRAA 61 73 61   GFRNAA 53* 64 53*   CA

## 2021-02-14 NOTE — PROGRESS NOTES
[facility] Pharmacy Note:  Age Based Dose Adjustment    Jaquan Sanchez has been prescribed ketorolac (TORADOL) 30 mg IV every 6 hours as needed. Patient is >71 years old therefore the dose has been adjusted to 15 mg IV every 6 hours as needed.       Thank you,

## 2021-02-15 LAB
GLUCOSE BLD-MCNC: 108 MG/DL (ref 70–99)
GLUCOSE BLD-MCNC: 116 MG/DL (ref 70–99)
GLUCOSE BLD-MCNC: 117 MG/DL (ref 70–99)
GLUCOSE BLD-MCNC: 117 MG/DL (ref 70–99)

## 2021-02-15 PROCEDURE — C9113 INJ PANTOPRAZOLE SODIUM, VIA: HCPCS | Performed by: HOSPITALIST

## 2021-02-15 PROCEDURE — 94640 AIRWAY INHALATION TREATMENT: CPT

## 2021-02-15 PROCEDURE — 82962 GLUCOSE BLOOD TEST: CPT

## 2021-02-15 RX ORDER — METOCLOPRAMIDE HYDROCHLORIDE 5 MG/ML
5 INJECTION INTRAMUSCULAR; INTRAVENOUS EVERY 6 HOURS
Status: DISCONTINUED | OUTPATIENT
Start: 2021-02-15 | End: 2021-02-17

## 2021-02-15 NOTE — CM/SW NOTE
Care Progression Note:  Active Acute Medical Issue:   Small bowel obstruction (HCC)   POD #2 Exploratory laparotomy with lysis of adhesions.   Per RN report, feeling slightly bloated,   NPO, NGT     Other Contributing Medical Factors/Dx.:   OA, GERD, asthma

## 2021-02-15 NOTE — PLAN OF CARE
Ng to Hannabillie Barronmarie with light brown drainage. Pt c/o of abd tenderness, bs hypo,denies flatus. Midline incision with Aquacel cdi. Pt is using is. Ambulating. Poc updated, pt verbalized understanding.         Problem: GENITOURINARY - ADULT  Goal: Absence of urinary r

## 2021-02-15 NOTE — PROGRESS NOTES
[facility] Hospitalist Progress Note                                                                   [address]  1/20/1945    CC: FU SBO    Interval History:  NGT in place.  Not passing fla 33.9 32.3 32.5   RDW 12.3 12.7 12.8   NEPRELIM 9.49* 3.26 3.55   WBC 11.4* 5.1 5.2   .0 280.0 261.0       Recent Labs   Lab 02/12/21  1150 02/13/21  0814 02/14/21  0533   * 81 103*   BUN 18 19* 18   CREATSERUM 1.03* 0.88 1.02   GFRAA 61 73 61

## 2021-02-15 NOTE — PLAN OF CARE
A&Ox4. VSS. RA. . No telemetry. Denies cardiac symptoms. GI: Abdomen soft, nondistended. Hypoactive bowel sounds. Denies passing gas. Denies nausea. : Voids. Pain controlled with PRN pain medications.   Up with standby assist.  NGT to LIS at 66cm- techniques  - Monitor for opioid side effects  - Notify MD/LIP if interventions unsuccessful or patient reports new pain  - Anticipate increased pain with activity and pre-medicate as appropriate  Outcome: Progressing     Problem: GASTROINTESTINAL - ADULT over-consumption  Outcome: Progressing     Problem: METABOLIC/FLUID AND ELECTROLYTES - ADULT  Goal: Electrolytes maintained within normal limits  Description: INTERVENTIONS:  - Monitor labs and rhythm and assess patient for signs and symptoms of electrolyt

## 2021-02-15 NOTE — OPERATIVE REPORT
[address]    PATIENT'S NAME: Paulina Enamorado   ATTENDING PHYSICIAN: Yaniv Martinez M.D. OPERATING PHYSICIAN: Mahi Montgomery M.D.    PATIENT ACCOUNT#:   [de-identified]    LOCATION:  [address] [place] [address]  MEDICAL RECORD #:   [id number]       DATE OF BIRTH: The fluid was aspirated. A bilateral trans-abdominis plane block was performed by infusing 20 mL of a mixture of 0.5% Marcaine and 1% Xylocaine in the trans-abdominis plane bilaterally.   This was followed by closing the abdomen by reapproximating the fasc

## 2021-02-15 NOTE — PROGRESS NOTES
[facility]  Progress Note    Pat Olivo Patient Status:  Inpatient    1945 MRN [id number]   [facility] 3NW-A Attending Rangel Flynn MD   [facility] Day # 3 PCP Pierre Barron MD     Subjective:    Patient denies any fla

## 2021-02-15 NOTE — PAYOR COMM NOTE
Appropriate for inpatient status per guidelines for abd pain, N&V due to SBO requiring surgical intervention.       --------------  ADMISSION REVIEW     Payor: [address] [place] [address] #:  [id number]  Authorization Number: [id number]       ED headache     Otherwise a complete review of systems was obtained and other than the HPI was negative     The patient's medication list, past medical history and social history elements is as listed in today's nurse's notes are reviewed and agree.  The patie Course     Labs Reviewed   COMP METABOLIC PANEL (14) - Abnormal; Notable for the following components:       Result Value    Glucose 130 (*)     Sodium 132 (*)     Creatinine 1.03 (*)     GFR, Non- 53 (*)     All other components within nor includes the Dose Index Registry. PATIENT STATED HISTORY:(As transcribed by Technologist)  Mid abd pain with emesis for 3 days after taking new medication Meloxicam per patient.    CONTRAST USED:  94cc of Omnipaque 350  FINDINGS:  LIVER:  No enlargement, a creatinine of 1.03. Glucose was slightly elevated at 130. CBC showed a white count of 11,000. Lipase was normal.  Urinalysis showed ketones. She is given an IV fluid bolus, morphine, Pepcid and Zofran. She was sent for CT abdomen and pelvis.   CT abdom repeated doses of morphine for pain control and Zofran for nausea. Currently she is nontoxic-appearing. Labs are reviewed. I agree with the assessment and plan.   I reviewed all the care with patient's daughter as well                 H&P - H&P Note ALT 21 02/12/2021     02/12/2021         Assessment/Plan:   Principal Problem:    Small bowel obstruction (HCC)  Active Problems:    Hyponatremia    Intractable vomiting with nausea, unspecified vomiting type    Dehydration    Hyperglycemia    # (36.8 °C) 84 18 107/82 99 % — None (Room air)       02/12/21 1139 98.5 °F (36.9 °C) 95 18 141/57 96 % 177 lb None (Room air)               MEDICATIONS ADMINISTERED IN LAST 1 DAY:  cefOXitin Sodium (MEFOXIN) 1 g in sodium chloride 0.9% 100 mL MBP     Date A

## 2021-02-16 LAB — GLUCOSE BLD-MCNC: 115 MG/DL (ref 70–99)

## 2021-02-16 PROCEDURE — 82962 GLUCOSE BLOOD TEST: CPT

## 2021-02-16 PROCEDURE — C9113 INJ PANTOPRAZOLE SODIUM, VIA: HCPCS | Performed by: HOSPITALIST

## 2021-02-16 RX ORDER — BISACODYL 10 MG
10 SUPPOSITORY, RECTAL RECTAL ONCE
Status: COMPLETED | OUTPATIENT
Start: 2021-02-16 | End: 2021-02-16

## 2021-02-16 NOTE — PLAN OF CARE
Upon reassessment, pt continues to c/o upper abdomen discomfort/bloating. Morphine 2mg given as requested/ordered with good relief. Pt denies nausea, reports belching. Pt denies flatus. Pt ambulated 5 times today so far. NGT residual checked, 0 ml.  Tyra Hendrix

## 2021-02-16 NOTE — PLAN OF CARE
Upon assessment, pt appears uncomfortable. B/P 173/73, HR 75. Pt is afebrile. Pt denies chest pain/SOB. Pt c/o upper abdomen pain, rated 4/10. Morphine given as requested. Pain and B/P improved after Morphine.  Pt is tearful and frustrated at lack of progre INTERVENTIONS:  - Maintain adequate hydration with IV or PO as ordered and tolerated  - Nasogastric tube to low intermittent suction as ordered  - Evaluate effectiveness of ordered antiemetic medications  - Provide nonpharmacologic comfort measures as appr dressing/incision, wound bed, drain sites and surrounding tissue  - Implement wound care per orders  - Initiate isolation precautions as appropriate  - Initiate Pressure Ulcer prevention bundle as indicated  Outcome: Progressing

## 2021-02-16 NOTE — PROGRESS NOTES
[facility] Hospitalist Progress Note                                                                   [address]  1/20/1945    CC: FU SBO    Interval History:  No flatus yet but feels that MCHC 33.9 32.3 32.5   RDW 12.3 12.7 12.8   NEPRELIM 9.49* 3.26 3.55   WBC 11.4* 5.1 5.2   .0 280.0 261.0       Recent Labs   Lab 02/12/21  1150 02/13/21  0814 02/14/21  0533   * 81 103*   BUN 18 19* 18   CREATSERUM 1.03* 0.88 1.02   GFRAA 6

## 2021-02-16 NOTE — PLAN OF CARE
Pt a&ox4, vss, afebrile. Abdomen soft, rounded on assessment. Denies gas, bowel sounds hypoactive. Denies nausea. NGT to LIS with dark green output. NPO except ice chips and gum. IVF infusing. Abdominal incision with gauze dressing c/d/i. Denies pain.  Pt v pre-medicate as appropriate  Outcome: Progressing     Problem: GASTROINTESTINAL - ADULT  Goal: Minimal or absence of nausea and vomiting  Description: INTERVENTIONS:  - Maintain adequate hydration with IV or PO as ordered and tolerated  - Nasogastric tube Monitor labs and rhythm and assess patient for signs and symptoms of electrolyte imbalances  - Administer electrolyte replacement as ordered  - Monitor response to electrolyte replacements, including rhythm and repeat lab results as appropriate  - Fluid re

## 2021-02-16 NOTE — PROGRESS NOTES
[facility]  Progress Note    Lula Hoskins Patient Status:  Inpatient    1945 MRN [id number]   [facility] 3NW-A Attending Anil Doss MD   [facility] Day # 4 PCP Lester Castillo MD     Subjective:    Patient denies any fla answered      ROCÍO Woody [address] [place] [address]  General Surgery  2/16/2021

## 2021-02-17 LAB
ANION GAP SERPL CALC-SCNC: 6 MMOL/L (ref 0–18)
BASOPHILS # BLD AUTO: 0.02 X10(3) UL (ref 0–0.2)
BASOPHILS NFR BLD AUTO: 0.4 %
BUN BLD-MCNC: 5 MG/DL (ref 7–18)
BUN/CREAT SERPL: 8.6 (ref 10–20)
CALCIUM BLD-MCNC: 8.9 MG/DL (ref 8.5–10.1)
CHLORIDE SERPL-SCNC: 105 MMOL/L (ref 98–112)
CO2 SERPL-SCNC: 27 MMOL/L (ref 21–32)
CREAT BLD-MCNC: 0.58 MG/DL
DEPRECATED RDW RBC AUTO: 41.7 FL (ref 35.1–46.3)
EOSINOPHIL # BLD AUTO: 0.11 X10(3) UL (ref 0–0.7)
EOSINOPHIL NFR BLD AUTO: 2.3 %
ERYTHROCYTE [DISTWIDTH] IN BLOOD BY AUTOMATED COUNT: 12.8 % (ref 11–15)
GLUCOSE BLD-MCNC: 113 MG/DL (ref 70–99)
HCT VFR BLD AUTO: 31.3 %
HGB BLD-MCNC: 10.4 G/DL
IMM GRANULOCYTES # BLD AUTO: 0.05 X10(3) UL (ref 0–1)
IMM GRANULOCYTES NFR BLD: 1.1 %
LYMPHOCYTES # BLD AUTO: 1.18 X10(3) UL (ref 1–4)
LYMPHOCYTES NFR BLD AUTO: 24.8 %
MCH RBC QN AUTO: 29.7 PG (ref 26–34)
MCHC RBC AUTO-ENTMCNC: 33.2 G/DL (ref 31–37)
MCV RBC AUTO: 89.4 FL
MONOCYTES # BLD AUTO: 0.51 X10(3) UL (ref 0.1–1)
MONOCYTES NFR BLD AUTO: 10.7 %
NEUTROPHILS # BLD AUTO: 2.89 X10 (3) UL (ref 1.5–7.7)
NEUTROPHILS # BLD AUTO: 2.89 X10(3) UL (ref 1.5–7.7)
NEUTROPHILS NFR BLD AUTO: 60.7 %
OSMOLALITY SERPL CALC.SUM OF ELEC: 284 MOSM/KG (ref 275–295)
PLATELET # BLD AUTO: 281 10(3)UL (ref 150–450)
POTASSIUM SERPL-SCNC: 3.2 MMOL/L (ref 3.5–5.1)
RBC # BLD AUTO: 3.5 X10(6)UL
SODIUM SERPL-SCNC: 138 MMOL/L (ref 136–145)
WBC # BLD AUTO: 4.8 X10(3) UL (ref 4–11)

## 2021-02-17 PROCEDURE — 85025 COMPLETE CBC W/AUTO DIFF WBC: CPT | Performed by: HOSPITALIST

## 2021-02-17 PROCEDURE — C9113 INJ PANTOPRAZOLE SODIUM, VIA: HCPCS | Performed by: HOSPITALIST

## 2021-02-17 PROCEDURE — 80048 BASIC METABOLIC PNL TOTAL CA: CPT | Performed by: HOSPITALIST

## 2021-02-17 RX ORDER — LISINOPRIL AND HYDROCHLOROTHIAZIDE 12.5; 1 MG/1; MG/1
1 TABLET ORAL
Status: DISCONTINUED | OUTPATIENT
Start: 2021-02-17 | End: 2021-02-17 | Stop reason: SDUPTHER

## 2021-02-17 RX ORDER — ALBUTEROL SULFATE 90 UG/1
2 AEROSOL, METERED RESPIRATORY (INHALATION) EVERY 6 HOURS PRN
Status: DISCONTINUED | OUTPATIENT
Start: 2021-02-17 | End: 2021-02-18

## 2021-02-17 RX ORDER — POTASSIUM CHLORIDE 20 MEQ/1
40 TABLET, EXTENDED RELEASE ORAL EVERY 4 HOURS
Status: DISPENSED | OUTPATIENT
Start: 2021-02-17 | End: 2021-02-18

## 2021-02-17 NOTE — PROGRESS NOTES
[facility]  Progress Note    Zakiya Ask Patient Status:  Inpatient    1945 MRN [id number]   [facility] 3NW-A Attending Davidson Patricio MD   [facility] Day # 5 PCP Frannie Duncan MD     Subjective:    Patient tolerated NG t

## 2021-02-17 NOTE — PLAN OF CARE
Pt tolerating sips of water and ice chips. Pt reports passing flatus this morning and denies nausea; abd soft with active bowel sounds. Pt rating pain at 1 and declines any analgesia. Pt to be started on CL diet for breakfast and lunch.

## 2021-02-17 NOTE — PROGRESS NOTES
[address] Hospitalist Progress Note                                                                   [address]  1/20/1945    CC: FU SBO    Interval History:  NG out passing gas and having 103*   BUN 18 19* 18   CREATSERUM 1.03* 0.88 1.02   GFRAA 61 73 61   GFRNAA 53* 64 53*   CA 10.0 8.2* 8.6   * 142 141   K 3.8 3.7 4.1  4.1   CL 98 106 110   CO2 25.0 27.0 22.0           ROS: no change to ROS from documentation yesterday, except as ot

## 2021-02-17 NOTE — PLAN OF CARE
Pt is alert and oriented x4. Lungs are clear bilaterally on room air. Bowel sounds are active. Pt reports passing 2 small bowel movements during the day after a suppository. Denies any nausea. Tolerating sips of clears.  Pt is eager to advance diet in the m Notify MD/LIP if interventions unsuccessful or patient reports new pain  - Anticipate increased pain with activity and pre-medicate as appropriate  Outcome: Progressing     Problem: GASTROINTESTINAL - ADULT  Goal: Minimal or absence of nausea and vomiting METABOLIC/FLUID AND ELECTROLYTES - ADULT  Goal: Electrolytes maintained within normal limits  Description: INTERVENTIONS:  - Monitor labs and rhythm and assess patient for signs and symptoms of electrolyte imbalances  - Administer electrolyte replacement a

## 2021-02-18 VITALS
BODY MASS INDEX: 32.57 KG/M2 | RESPIRATION RATE: 19 BRPM | DIASTOLIC BLOOD PRESSURE: 71 MMHG | OXYGEN SATURATION: 97 % | HEIGHT: 62 IN | WEIGHT: 177 LBS | TEMPERATURE: 98 F | SYSTOLIC BLOOD PRESSURE: 150 MMHG | HEART RATE: 85 BPM

## 2021-02-18 LAB — POTASSIUM SERPL-SCNC: 3.2 MMOL/L (ref 3.5–5.1)

## 2021-02-18 PROCEDURE — 84132 ASSAY OF SERUM POTASSIUM: CPT | Performed by: HOSPITALIST

## 2021-02-18 PROCEDURE — C9113 INJ PANTOPRAZOLE SODIUM, VIA: HCPCS | Performed by: HOSPITALIST

## 2021-02-18 RX ORDER — TRAMADOL HYDROCHLORIDE 50 MG/1
50 TABLET ORAL EVERY 6 HOURS PRN
Qty: 20 TABLET | Refills: 0 | Status: SHIPPED | OUTPATIENT
Start: 2021-02-18 | End: 2021-02-28

## 2021-02-18 RX ORDER — TRAMADOL HYDROCHLORIDE 50 MG/1
50 TABLET ORAL EVERY 6 HOURS PRN
Status: DISCONTINUED | OUTPATIENT
Start: 2021-02-18 | End: 2021-02-18

## 2021-02-18 RX ORDER — POTASSIUM CHLORIDE 20 MEQ/1
40 TABLET, EXTENDED RELEASE ORAL EVERY 4 HOURS
Status: DISCONTINUED | OUTPATIENT
Start: 2021-02-18 | End: 2021-02-18

## 2021-02-18 RX ORDER — ACETAMINOPHEN 325 MG/1
650 TABLET ORAL EVERY 6 HOURS PRN
Status: DISCONTINUED | OUTPATIENT
Start: 2021-02-18 | End: 2021-02-18

## 2021-02-18 NOTE — PLAN OF CARE
Pt tolerating soft diet without pain or nausea. Pt reports passing flatus. Pt states she is ready to go home today.

## 2021-02-18 NOTE — PROGRESS NOTES
[facility]  Progress Note    Lula Hale Patient Status:  Inpatient    1945 MRN [id number]   [facility] 3NW-A Attending Romi Sierra   [facility] Day # 6 PCP Nancy House MD     Subjective:      She does re Plan:    Transition to oral pain medication   Encourage ambulation/IS  Dc home today  Reviewed post op restrictions  All questions answered  F/u in 1 week    Jeannette Huynh [address] Surgery  2/18/2021

## 2021-02-18 NOTE — PLAN OF CARE
Pt alert and orientedx4. Room air. Encouraged to use IS. Slightly Hard of hearing. SCDs. On heparin. Low fiber/soft diet. Passing gas. Last bowel movement was 2/16. Morphine given x1 for abdominal pain. Denies N/V. Voiding.  Midline incision w/ skin glue, s appropriate  Outcome: Progressing     Problem: GASTROINTESTINAL - ADULT  Goal: Minimal or absence of nausea and vomiting  Description: INTERVENTIONS:  - Maintain adequate hydration with IV or PO as ordered and tolerated  - Nasogastric tube to low intermitt Assess and document risk factors for pressure ulcer development  - Assess and document skin integrity  - Assess and document dressing/incision, wound bed, drain sites and surrounding tissue  - Implement wound care per orders  - Initiate isolation precautio

## 2021-02-18 NOTE — DISCHARGE SUMMARY
General Medicine Discharge Summary     Patient ID:  Marito Dewitt  67year old  1/20/1945    Admit date: 2/12/2021    Discharge date and time:  2/18/21    Attending Physician: Chantel Ackerman CHANGED    Montelukast Sodium 10 MG Oral Tab  Take 1 tablet (10 mg total) by mouth every evening. omeprazole 20 MG Oral Capsule Delayed Release  Take 1 capsule (20 mg total) by mouth every other day.     !! Albuterol Sulfate  (90 Base) MCG/ACT Inh

## 2021-02-19 NOTE — PAYOR COMM NOTE
--------------  DISCHARGE REVIEW    Payor: [facility] [place] #:  [id number]  Authorization Number: [id number]    Admit date: 2/12/21  Admit time:  8012  Discharge Date: 2/18/2021  2:11 PM     Admitting Physician: Jovanni Ling lap/lysis of adhesions  - NG out- doing well tolerating diet, passing gas, has had BM  - Pain controlled     # HypoK  - 2/2 poor PO, repleted this AM and eating much better today     # HTN  - Home BP meds ordered, continue on DC     # GERD: PPI      Doing Order      Exam on day of discharge:      02/18/21  1202   BP: 150/71   Pulse: 85   Resp: 19   Temp: 98.4 °F (36.9 °C)       General: no acute distress, alert and oriented x 3  Heart: RRR  Lungs: clear bilaterally, no active wheezing  Abdomen: nontender, n

## 2021-02-26 PROBLEM — E86.0 DEHYDRATION: Status: RESOLVED | Noted: 2021-02-12 | Resolved: 2021-02-26

## 2021-02-26 PROBLEM — K56.609 SMALL BOWEL OBSTRUCTION (HCC): Status: RESOLVED | Noted: 2021-02-12 | Resolved: 2021-02-26

## 2021-02-26 PROBLEM — R73.9 HYPERGLYCEMIA: Status: RESOLVED | Noted: 2021-02-12 | Resolved: 2021-02-26

## 2021-02-26 PROBLEM — R11.2 INTRACTABLE VOMITING WITH NAUSEA, UNSPECIFIED VOMITING TYPE: Status: RESOLVED | Noted: 2021-02-12 | Resolved: 2021-02-26

## 2021-02-26 PROBLEM — E87.1 HYPONATREMIA: Status: RESOLVED | Noted: 2021-02-12 | Resolved: 2021-02-26

## 2022-02-28 PROBLEM — M46.00 SPINAL ENTHESOPATHY, SITE UNSPECIFIED: Status: ACTIVE | Noted: 2022-02-28

## 2022-02-28 PROBLEM — M46.00 SPINAL ENTHESOPATHY, SITE UNSPECIFIED (HCC): Status: ACTIVE | Noted: 2022-02-28

## 2022-02-28 PROBLEM — Z13.820 SCREENING FOR OSTEOPOROSIS: Status: RESOLVED | Noted: 2017-12-19 | Resolved: 2022-02-28

## 2024-05-18 ENCOUNTER — APPOINTMENT (OUTPATIENT)
Dept: GENERAL RADIOLOGY | Age: 79
End: 2024-05-18
Attending: EMERGENCY MEDICINE

## 2024-05-18 ENCOUNTER — APPOINTMENT (OUTPATIENT)
Dept: CT IMAGING | Age: 79
End: 2024-05-18
Attending: EMERGENCY MEDICINE

## 2024-05-18 ENCOUNTER — HOSPITAL ENCOUNTER (EMERGENCY)
Age: 79
Discharge: HOME | End: 2024-05-19
Attending: EMERGENCY MEDICINE

## 2024-05-18 VITALS
SYSTOLIC BLOOD PRESSURE: 156 MMHG | WEIGHT: 178 LBS | BODY MASS INDEX: 32.76 KG/M2 | RESPIRATION RATE: 18 BRPM | OXYGEN SATURATION: 100 % | TEMPERATURE: 99 F | HEART RATE: 64 BPM | HEIGHT: 62 IN | DIASTOLIC BLOOD PRESSURE: 66 MMHG

## 2024-05-18 DIAGNOSIS — D64.9 ANEMIA, UNSPECIFIED TYPE: ICD-10-CM

## 2024-05-18 DIAGNOSIS — K44.9 HIATAL HERNIA: ICD-10-CM

## 2024-05-18 DIAGNOSIS — R10.9 ABDOMINAL PAIN OF UNKNOWN ETIOLOGY: Primary | ICD-10-CM

## 2024-05-18 LAB
ALBUMIN SERPL-MCNC: 3.9 G/DL (ref 3.4–5)
ALBUMIN/GLOB SERPL: 1.1 {RATIO} (ref 1–2)
ALP LIVER SERPL-CCNC: 61 U/L
ALT SERPL-CCNC: 19 U/L
ANION GAP SERPL CALC-SCNC: 7 MMOL/L (ref 0–18)
AST SERPL-CCNC: 28 U/L (ref 15–37)
BASOPHILS # BLD AUTO: 0.03 X10(3) UL (ref 0–0.2)
BASOPHILS NFR BLD AUTO: 0.7 %
BILIRUB SERPL-MCNC: 0.3 MG/DL (ref 0.1–2)
BILIRUB UR QL STRIP.AUTO: NEGATIVE
BUN BLD-MCNC: 23 MG/DL (ref 9–23)
CALCIUM BLD-MCNC: 10.1 MG/DL (ref 8.5–10.1)
CHLORIDE SERPL-SCNC: 103 MMOL/L (ref 98–112)
CLARITY UR REFRACT.AUTO: CLEAR
CO2 SERPL-SCNC: 29 MMOL/L (ref 21–32)
COLOR UR AUTO: YELLOW
CREAT BLD-MCNC: 1.01 MG/DL
EGFRCR SERPLBLD CKD-EPI 2021: 56 ML/MIN/1.73M2 (ref 60–?)
EOSINOPHIL # BLD AUTO: 0.07 X10(3) UL (ref 0–0.7)
EOSINOPHIL NFR BLD AUTO: 1.5 %
ERYTHROCYTE [DISTWIDTH] IN BLOOD BY AUTOMATED COUNT: 13.1 %
GLOBULIN PLAS-MCNC: 3.6 G/DL (ref 2.8–4.4)
GLUCOSE BLD-MCNC: 100 MG/DL (ref 70–99)
GLUCOSE UR STRIP.AUTO-MCNC: NEGATIVE MG/DL
HCT VFR BLD AUTO: 33.8 %
HGB BLD-MCNC: 11.7 G/DL
IMM GRANULOCYTES # BLD AUTO: 0.01 X10(3) UL (ref 0–1)
IMM GRANULOCYTES NFR BLD: 0.2 %
KETONES UR STRIP.AUTO-MCNC: NEGATIVE MG/DL
LIPASE SERPL-CCNC: 70 U/L (ref 13–75)
LYMPHOCYTES # BLD AUTO: 1.64 X10(3) UL (ref 1–4)
LYMPHOCYTES NFR BLD AUTO: 36 %
MCH RBC QN AUTO: 30.6 PG (ref 26–34)
MCHC RBC AUTO-ENTMCNC: 34.6 G/DL (ref 31–37)
MCV RBC AUTO: 88.5 FL
MONOCYTES # BLD AUTO: 0.54 X10(3) UL (ref 0.1–1)
MONOCYTES NFR BLD AUTO: 11.8 %
NEUTROPHILS # BLD AUTO: 2.27 X10 (3) UL (ref 1.5–7.7)
NEUTROPHILS # BLD AUTO: 2.27 X10(3) UL (ref 1.5–7.7)
NEUTROPHILS NFR BLD AUTO: 49.8 %
NITRITE UR QL STRIP.AUTO: NEGATIVE
OSMOLALITY SERPL CALC.SUM OF ELEC: 292 MOSM/KG (ref 275–295)
PH UR STRIP.AUTO: 6.5 [PH] (ref 5–8)
PLATELET # BLD AUTO: 269 10(3)UL (ref 150–450)
POTASSIUM SERPL-SCNC: 3.8 MMOL/L (ref 3.5–5.1)
PROT SERPL-MCNC: 7.5 G/DL (ref 6.4–8.2)
PROT UR STRIP.AUTO-MCNC: NEGATIVE MG/DL
RBC # BLD AUTO: 3.82 X10(6)UL
SODIUM SERPL-SCNC: 139 MMOL/L (ref 136–145)
SP GR UR STRIP.AUTO: 1.01 (ref 1–1.03)
TROPONIN I SERPL HS-MCNC: 8 NG/L
UROBILINOGEN UR STRIP.AUTO-MCNC: 0.2 MG/DL
WBC # BLD AUTO: 4.6 X10(3) UL (ref 4–11)

## 2024-05-18 PROCEDURE — 87086 URINE CULTURE/COLONY COUNT: CPT | Performed by: EMERGENCY MEDICINE

## 2024-05-18 PROCEDURE — 84484 ASSAY OF TROPONIN QUANT: CPT | Performed by: EMERGENCY MEDICINE

## 2024-05-18 PROCEDURE — 93005 ELECTROCARDIOGRAM TRACING: CPT

## 2024-05-18 PROCEDURE — 71045 X-RAY EXAM CHEST 1 VIEW: CPT | Performed by: EMERGENCY MEDICINE

## 2024-05-18 PROCEDURE — 85025 COMPLETE CBC W/AUTO DIFF WBC: CPT | Performed by: EMERGENCY MEDICINE

## 2024-05-18 PROCEDURE — 81015 MICROSCOPIC EXAM OF URINE: CPT | Performed by: EMERGENCY MEDICINE

## 2024-05-18 PROCEDURE — 99285 EMERGENCY DEPT VISIT HI MDM: CPT

## 2024-05-18 PROCEDURE — 96360 HYDRATION IV INFUSION INIT: CPT

## 2024-05-18 PROCEDURE — 74177 CT ABD & PELVIS W/CONTRAST: CPT | Performed by: EMERGENCY MEDICINE

## 2024-05-18 PROCEDURE — 81001 URINALYSIS AUTO W/SCOPE: CPT | Performed by: EMERGENCY MEDICINE

## 2024-05-18 PROCEDURE — 80053 COMPREHEN METABOLIC PANEL: CPT | Performed by: EMERGENCY MEDICINE

## 2024-05-18 PROCEDURE — 83690 ASSAY OF LIPASE: CPT | Performed by: EMERGENCY MEDICINE

## 2024-05-18 PROCEDURE — 93010 ELECTROCARDIOGRAM REPORT: CPT

## 2024-05-18 PROCEDURE — 96372 THER/PROPH/DIAG INJ SC/IM: CPT

## 2024-05-18 RX ORDER — DIPHENHYDRAMINE HYDROCHLORIDE 50 MG/ML
25 INJECTION INTRAMUSCULAR; INTRAVENOUS ONCE
Status: DISCONTINUED | OUTPATIENT
Start: 2024-05-18 | End: 2024-05-19

## 2024-05-18 RX ORDER — DICYCLOMINE HYDROCHLORIDE 10 MG/ML
10 INJECTION INTRAMUSCULAR ONCE
Status: COMPLETED | OUTPATIENT
Start: 2024-05-18 | End: 2024-05-18

## 2024-05-18 RX ORDER — DICYCLOMINE HCL 20 MG
20 TABLET ORAL EVERY 8 HOURS PRN
Qty: 30 TABLET | Refills: 0 | Status: SHIPPED | OUTPATIENT
Start: 2024-05-18 | End: 2024-06-17

## 2024-05-18 RX ORDER — METOCLOPRAMIDE HYDROCHLORIDE 5 MG/ML
5 INJECTION INTRAMUSCULAR; INTRAVENOUS ONCE
Status: DISCONTINUED | OUTPATIENT
Start: 2024-05-18 | End: 2024-05-19

## 2024-05-19 LAB
ATRIAL RATE: 62 BPM
P AXIS: 70 DEGREES
P-R INTERVAL: 156 MS
Q-T INTERVAL: 430 MS
QRS DURATION: 84 MS
QTC CALCULATION (BEZET): 436 MS
R AXIS: 19 DEGREES
T AXIS: 46 DEGREES
VENTRICULAR RATE: 62 BPM

## 2024-05-19 NOTE — ED PROVIDER NOTES
Patient Seen in: [facility] In [place]      History     Chief Complaint   Patient presents with    Abdomen/Flank Pain     Stated Complaint: abdominal pain for two weeks    Subjective:   Patient is 79-year-old female with history of abdominal pain last week.  She is her primary doctor had a CT scan and blood work done as outpatient.  Patient reports that she feels a bloating feeling.  She had negative CAT scan.  Anything on that was a hiatal hernia which she knows about.  Patient reports last week she took Colace which she normally does not take.  Patient reports no chest pain    The history is provided by the patient and a relative.           Objective:   Past Medical History:    Arthritis    Asthma (HCC)    seasonal    COVID    Esophageal reflux    Extrinsic asthma, unspecified    Hiatal hernia    High blood pressure    Unspecified essential hypertension              Past Surgical History:   Procedure Laterality Date    Appendectomy      Benign biopsy right  05/2018    fibroadenoma    Colonoscopy N/A 07/25/2019    Procedure: COLONOSCOPY, POSSIBLE BIOPSY, POSSIBLE POLYPECTOMY 22147;  Surgeon: Sarah Mccain MD;  Location: [facility]    Colonoscopy      Eye surgery      Hernia surgery  02/11/2022    robotic ventral hernia repair w/ mesh    Hip replacement surgery      Other surgical history      Right breast biopsy    Removal of ovary/tube(s)                  Social History     Socioeconomic History    Marital status:    Tobacco Use    Smoking status: Former    Smokeless tobacco: Never    Tobacco comments:     quit 1985   Vaping Use    Vaping status: Never Used   Substance and Sexual Activity    Alcohol use: Not Currently     Alcohol/week: 1.0 standard drink of alcohol     Types: 1 Glasses of wine per week     Comment: 1x per week    Drug use: No              Review of Systems   Gastrointestinal:  Positive for abdominal distention and abdominal pain.       Positive for stated  complaint: abdominal pain for two weeks  Other systems are as noted in HPI.  Constitutional and vital signs reviewed.      All other systems reviewed and negative except as noted above.    Physical Exam     ED Triage Vitals [05/18/24 2022]   /66   Pulse 78   Resp 16   Temp 98.7 °F (37.1 °C)   Temp src    SpO2 96 %   O2 Device None (Room air)       Current Vitals:   Vital Signs  BP: 156/66  Pulse: 64  Resp: 18  Temp: 98.7 °F (37.1 °C)    Oxygen Therapy  SpO2: 100 %  O2 Device: None (Room air)            Physical Exam  Vitals reviewed.   Constitutional:       General: She is not in acute distress.     Appearance: She is well-developed. She is obese. She is not toxic-appearing.   HENT:      Head: Normocephalic and atraumatic.   Eyes:      Extraocular Movements: Extraocular movements intact.      Pupils: Pupils are equal, round, and reactive to light.   Cardiovascular:      Rate and Rhythm: Normal rate and regular rhythm.      Heart sounds: Normal heart sounds.   Pulmonary:      Effort: Pulmonary effort is normal.      Breath sounds: Normal breath sounds.   Abdominal:      General: Abdomen is flat. Bowel sounds are increased.      Palpations: Abdomen is soft.      Tenderness: There is no abdominal tenderness. There is no guarding.   Skin:     General: Skin is warm.      Capillary Refill: Capillary refill takes less than 2 seconds.   Neurological:      General: No focal deficit present.      Mental Status: She is alert and oriented to person, place, and time.   Psychiatric:         Mood and Affect: Mood normal.         Behavior: Behavior normal.               ED Course     Labs Reviewed   COMP METABOLIC PANEL (14) - Abnormal; Notable for the following components:       Result Value    Glucose 100 (*)     eGFR-Cr 56 (*)     All other components within normal limits   URINALYSIS WITH CULTURE REFLEX - Abnormal; Notable for the following components:    Blood Urine Trace-Intact (*)     Leukocyte Esterase Urine Trace (*)      Squamous Epi. Cells Few (*)     All other components within normal limits   UA MICROSCOPIC ONLY, URINE - Abnormal; Notable for the following components:    Squamous Epi. Cells Few (*)     All other components within normal limits   CBC W/ DIFFERENTIAL - Abnormal; Notable for the following components:    HGB 11.7 (*)     HCT 33.8 (*)     All other components within normal limits   LIPASE - Normal   TROPONIN I HIGH SENSITIVITY - Normal   CBC WITH DIFFERENTIAL WITH PLATELET    Narrative:     The following orders were created for panel order CBC With Differential With Platelet.  Procedure                               Abnormality         Status                     ---------                               -----------         ------                     CBC W/ DIFFERENTIAL[[id number]]          Abnormal            Final result                 Please view results for these tests on the individual orders.   URINE CULTURE, ROUTINE     EKG shows a normal sinus rhythm with a ventricular rate of 62 CO interval 156 QRS of 84 QTc of 436 with axes of 70/19/46        CT ABDOMEN+PELVIS(CONTRAST ONLY)(CPT=74177)    Result Date: 5/18/2024  PROCEDURE:  CT ABDOMEN+PELVIS (CONTRAST ONLY) (CPT=74177)  COMPARISON:  [facility], CT, CT ABDOMEN+PELVIS(CONTRAST ONLY)(CPT=74177), 2/12/2021, 12:59 PM.  INDICATIONS:  abdominal pain for two weeks  TECHNIQUE:  CT scanning was performed from the dome of the diaphragm to the pubic symphysis with non-ionic intravenous contrast material. Post contrast coronal MPR imaging was performed.  Dose reduction techniques were used. Dose information is transmitted to the ACR (American College of Radiology) NRDR (National Radiology Data Registry) which includes the Dose Index Registry.  FINDINGS: LUNG BASE:  Scattered atelectasis/scarring. LIVER:  Unremarkable. BILIARY:  Unremarkable. SPLEEN:  Unremarkable. PANCREAS:  Unremarkable. ADRENALS:  Unremarkable. KIDNEYS:  Subcentimeter hypodensities in the kidneys  measuring up to 4 mm are too small to further characterize and warrant no specific follow-up. AORTA/VASCULAR:  Mild mixed plaque in the aorta RETROPERITONEUM:  Unremarkable. BOWEL/MESENTERY:  Medium-sized hiatal hernia.  No large or small bowel dilatation.  Scattered feces in the colon.  No free air or free fluid.  Uncomplicated colonic diverticulosis. ABDOMINAL WALL:  Scarring from previous surgery. PELVIC ORGANS:  Limited in evaluation due to beam hardening artifact from the left hip arthroplasty device.  Unremarkable uterus.  Atrophic ovaries.  Nondistended urinary bladder. LYMPH NODES:  No lymphadenopathy in the abdomen or pelvis. BONES:  Degenerative changes in the spine and right hip.  Left total hip arthroplasty device noted. OTHER:  None.            CONCLUSION:   1. No acute intra-abdominal/pelvic process identified.  2. Uncomplicated colonic diverticulosis.  3. Medium-sized hiatal hernia.   Please see above for further details.  LOCATION:  [facility]   Dictated by (CST): Aguila Davis MD on 5/18/2024 at 11:35 PM     Finalized by (CST): Aguila Davis MD on 5/18/2024 at 11:37 PM       XR CHEST AP PORTABLE  (CPT=71045)    Result Date: 5/18/2024  PROCEDURE:  XR CHEST AP PORTABLE  (CPT=71045)  TECHNIQUE:  AP chest radiograph was obtained.  COMPARISON:  [facility], XR, XR CHEST AP PORTABLE  (CPT=71045), 2/12/2021, 2:45 PM.  INDICATIONS:  abdominal pain for two weeks  PATIENT STATED HISTORY: (As transcribed by Technologist)  Patient states epigastric pain for 2 days, no chesy complaints.    FINDINGS:  Small hiatal hernia. Normal heart size and pulmonary vascularity. No pleural effusion or pneumothorax. No lobar consolidation.  Degenerative changes in the spine.            CONCLUSION:  No active cardiopulmonary process identified.   LOCATION:  [facility]      Dictated by (CST): Aguila Davis MD on 5/18/2024 at 11:20 PM     Finalized by (CST): Aguila Davis MD on 5/18/2024 at 11:20 PM               [facility]      Social  -negative tobacco, occasional etoh, negative drugs  Family History-noncontributory  Past Medical History-hernia, arthritis, high blood pressure, asthma, GERD, hiatal hernia    Differential diagnosis before testing included hiatal hernia, gastroenteritis, bowel obstruction, enteritis, gastritis    Co-morbidities that add to the complexity of management include: Hiatal hernia patient has never seen GI specialist for an EGD    Testing ordered during this visit included CT scan baseline labs EKG troponin    Radiographic images  I personally reviewed the radiographs and my individual interpretation shows CT shows a hiatal hernia no bowel obstruction, chest x-ray shows a hiatal hernia  I also reviewed the official reports that showed CT ABDOMEN+PELVIS(CONTRAST ONLY)(CPT=74177)    Result Date: 5/18/2024  PROCEDURE:  CT ABDOMEN+PELVIS (CONTRAST ONLY) (CPT=74177)  COMPARISON:  [facility], CT, CT ABDOMEN+PELVIS(CONTRAST ONLY)(CPT=74177), 2/12/2021, 12:59 PM.  INDICATIONS:  abdominal pain for two weeks  TECHNIQUE:  CT scanning was performed from the dome of the diaphragm to the pubic symphysis with non-ionic intravenous contrast material. Post contrast coronal MPR imaging was performed.  Dose reduction techniques were used. Dose information is transmitted to the ACR (American College of Radiology) NRDR (National Radiology Data Registry) which includes the Dose Index Registry.  FINDINGS: LUNG BASE:  Scattered atelectasis/scarring. LIVER:  Unremarkable. BILIARY:  Unremarkable. SPLEEN:  Unremarkable. PANCREAS:  Unremarkable. ADRENALS:  Unremarkable. KIDNEYS:  Subcentimeter hypodensities in the kidneys measuring up to 4 mm are too small to further characterize and warrant no specific follow-up. AORTA/VASCULAR:  Mild mixed plaque in the aorta RETROPERITONEUM:  Unremarkable. BOWEL/MESENTERY:  Medium-sized hiatal hernia.  No large or small bowel dilatation.  Scattered feces in the colon.  No free air or free fluid.  Uncomplicated  colonic diverticulosis. ABDOMINAL WALL:  Scarring from previous surgery. PELVIC ORGANS:  Limited in evaluation due to beam hardening artifact from the left hip arthroplasty device.  Unremarkable uterus.  Atrophic ovaries.  Nondistended urinary bladder. LYMPH NODES:  No lymphadenopathy in the abdomen or pelvis. BONES:  Degenerative changes in the spine and right hip.  Left total hip arthroplasty device noted. OTHER:  None.            CONCLUSION:   1. No acute intra-abdominal/pelvic process identified.  2. Uncomplicated colonic diverticulosis.  3. Medium-sized hiatal hernia.   Please see above for further details.  LOCATION:  [facility]   Dictated by (CST): Aguila Davis MD on 5/18/2024 at 11:35 PM     Finalized by (CST): Aguila Davis MD on 5/18/2024 at 11:37 PM       XR CHEST AP PORTABLE  (CPT=71045)    Result Date: 5/18/2024  PROCEDURE:  XR CHEST AP PORTABLE  (CPT=71045)  TECHNIQUE:  AP chest radiograph was obtained.  COMPARISON:  [facility], XR, XR CHEST AP PORTABLE  (CPT=71045), 2/12/2021, 2:45 PM.  INDICATIONS:  abdominal pain for two weeks  PATIENT STATED HISTORY: (As transcribed by Technologist)  Patient states epigastric pain for 2 days, no chesy complaints.    FINDINGS:  Small hiatal hernia. Normal heart size and pulmonary vascularity. No pleural effusion or pneumothorax. No lobar consolidation.  Degenerative changes in the spine.            CONCLUSION:  No active cardiopulmonary process identified.   LOCATION:  [facility]      Dictated by (CST): Aguila Davis MD on 5/18/2024 at 11:20 PM     Finalized by (CST): Aguila Davis MD on 5/18/2024 at 11:20 PM          External chart review showed review of care everywhere in [facility] system shows an outpatient CT scan that showed no significant findings other than a hiatal hernia.    History obtained by an independent source included from patient, family    Discussion of management with patient, family    Social determinants of health that affect care include patient  has had bowel obstructions in the past with resection.  Patient has increased bowel sounds in upper abdomen concern for underlying bowel obstruction despite recent CT will recommend repeating      Medications Provided: Bentyl, IV fluids    Course of Events during Emergency Room Visit include 7-year-old female presents emergency room with generalized abdominal pain.  Patient reports feeling of bloating.  She reports her pants feel tight on her.  She does have a history of bowel obstructions in the past.  Will check a CT scan again even though her recent CT due to patient's risk factors and increased bowel sounds on exam.  Thankfully CT does not show any acute bowel obstruction.  Her hiatal hernia is still again mentioned on imaging her EKG is unremarkable and troponins negative.  Will recommend Bentyl.  Patient improved with her abdominal pain with the Bentyl.  Will give Bentyl for home.  Recommend she follows up with GI for an outpatient EGD.  Also recommends patient stick with a bland diet.      Recheck the patient at 12 AM she is feeling better.  Will give a prescription for Bentyl for home recommend GI follow-up for EGD as outpatient.    Disposition:        Discharge  I have discussed with the patient the results of test, differential diagnosis, treatment plan, warning signs and symptoms which should prompt immediate return.  They expressed understanding of these instructions and agrees to the following plan provided.  They were given written discharge instructions and agrees to return for any concerns and voiced understanding and all questions were answered.                                      Medical Decision Making      Disposition and Plan     Clinical Impression:  1. Abdominal pain of unknown etiology    2. Hiatal hernia    3. Anemia, unspecified type         Disposition:  Discharge  5/19/2024 12:11 am    Follow-up:  Prasanth Santamaria MD [address] [place]  [id number] [phone]    Schedule an appointment as soon as possible for a visit      Amari Mckeon MD  [address] [facility] [address]  [facility] [id number] [phone]    Schedule an appointment as soon as possible for a visit            Medications Prescribed:  Discharge Medication List as of 5/19/2024 12:12 AM        START taking these medications    Details   dicyclomine 20 MG Oral Tab Take 1 tablet (20 mg total) by mouth every 8 (eight) hours as needed (ABDOMINAL CRAMPING)., Normal, Disp-30 tablet, R-0

## 2024-05-19 NOTE — ED INITIAL ASSESSMENT (HPI)
Diffuse abd pain x2 weeks. States worse over the past week. Nausea w/o vomiting. States was constipated a week ago and took stool softeners and they \"seemed to work\" but still feels full. Had a CT and blood work done by PCP Thursday and was told everything looked great. States new lower back pain today.

## 2024-12-20 ENCOUNTER — APPOINTMENT (OUTPATIENT)
Dept: GENERAL RADIOLOGY | Age: 79
End: 2024-12-20
Attending: PHYSICIAN ASSISTANT
Payer: COMMERCIAL

## 2024-12-20 ENCOUNTER — APPOINTMENT (OUTPATIENT)
Dept: CT IMAGING | Age: 79
End: 2024-12-20
Attending: PHYSICIAN ASSISTANT
Payer: COMMERCIAL

## 2024-12-20 ENCOUNTER — HOSPITAL ENCOUNTER (EMERGENCY)
Age: 79
Discharge: HOME | End: 2024-12-20
Payer: COMMERCIAL

## 2024-12-20 VITALS
HEIGHT: 62 IN | HEART RATE: 78 BPM | SYSTOLIC BLOOD PRESSURE: 145 MMHG | DIASTOLIC BLOOD PRESSURE: 69 MMHG | RESPIRATION RATE: 16 BRPM | WEIGHT: 170 LBS | TEMPERATURE: 98 F | BODY MASS INDEX: 31.28 KG/M2 | OXYGEN SATURATION: 97 %

## 2024-12-20 DIAGNOSIS — M25.552 PAIN OF LEFT HIP: ICD-10-CM

## 2024-12-20 DIAGNOSIS — M25.561 ACUTE PAIN OF RIGHT KNEE: ICD-10-CM

## 2024-12-20 DIAGNOSIS — M54.2 NECK PAIN: Primary | ICD-10-CM

## 2024-12-20 PROCEDURE — 73502 X-RAY EXAM HIP UNI 2-3 VIEWS: CPT | Performed by: PHYSICIAN ASSISTANT

## 2024-12-20 PROCEDURE — 99284 EMERGENCY DEPT VISIT MOD MDM: CPT

## 2024-12-20 PROCEDURE — 73562 X-RAY EXAM OF KNEE 3: CPT | Performed by: PHYSICIAN ASSISTANT

## 2024-12-20 PROCEDURE — 72125 CT NECK SPINE W/O DYE: CPT | Performed by: PHYSICIAN ASSISTANT
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
## 2024-12-20 NOTE — DISCHARGE INSTRUCTIONS
Continue to take Tylenol and ibuprofen for pain as needed.  Rest and avoid any heavy lifting until symptoms improve, use Lidoderm patches or ice or heat pack if helpful.  Return to the ER for any other new or worsening symptoms.

## 2025-06-23 ENCOUNTER — HOSPITAL ENCOUNTER (EMERGENCY)
Age: 80
Discharge: HOME | End: 2025-06-23
Attending: EMERGENCY MEDICINE
Payer: MEDICARE

## 2025-06-23 ENCOUNTER — APPOINTMENT (OUTPATIENT)
Dept: CT IMAGING | Age: 80
End: 2025-06-23
Attending: EMERGENCY MEDICINE
Payer: MEDICARE

## 2025-06-23 ENCOUNTER — APPOINTMENT (OUTPATIENT)
Dept: GENERAL RADIOLOGY | Age: 80
End: 2025-06-23
Attending: EMERGENCY MEDICINE
Payer: MEDICARE

## 2025-06-23 VITALS
HEIGHT: 62 IN | RESPIRATION RATE: 16 BRPM | SYSTOLIC BLOOD PRESSURE: 140 MMHG | DIASTOLIC BLOOD PRESSURE: 68 MMHG | HEART RATE: 78 BPM | BODY MASS INDEX: 31.28 KG/M2 | WEIGHT: 170 LBS | TEMPERATURE: 98 F | OXYGEN SATURATION: 97 %

## 2025-06-23 DIAGNOSIS — K57.92 ACUTE DIVERTICULITIS: Primary | ICD-10-CM

## 2025-06-23 LAB
ALBUMIN SERPL-MCNC: 4.7 G/DL (ref 3.2–4.8)
ALBUMIN/GLOB SERPL: 2 {RATIO} (ref 1–2)
ALP LIVER SERPL-CCNC: 73 U/L (ref 55–142)
ALT SERPL-CCNC: 12 U/L (ref 10–49)
ANION GAP SERPL CALC-SCNC: 9 MMOL/L (ref 0–18)
AST SERPL-CCNC: 23 U/L (ref ?–34)
BASOPHILS # BLD AUTO: 0.03 X10(3) UL (ref 0–0.2)
BASOPHILS NFR BLD AUTO: 0.4 %
BILIRUB SERPL-MCNC: 0.4 MG/DL (ref 0.2–1.1)
BILIRUB UR QL STRIP.AUTO: NEGATIVE
BUN BLD-MCNC: 14 MG/DL (ref 9–23)
CALCIUM BLD-MCNC: 9.6 MG/DL (ref 8.7–10.6)
CHLORIDE SERPL-SCNC: 100 MMOL/L (ref 98–112)
CLARITY UR REFRACT.AUTO: CLEAR
CO2 SERPL-SCNC: 27 MMOL/L (ref 21–32)
COLOR UR AUTO: YELLOW
CREAT BLD-MCNC: 0.79 MG/DL (ref 0.55–1.02)
EGFRCR SERPLBLD CKD-EPI 2021: 75 ML/MIN/1.73M2 (ref 60–?)
EOSINOPHIL # BLD AUTO: 0.07 X10(3) UL (ref 0–0.7)
EOSINOPHIL NFR BLD AUTO: 1 %
ERYTHROCYTE [DISTWIDTH] IN BLOOD BY AUTOMATED COUNT: 12.8 %
GLOBULIN PLAS-MCNC: 2.4 G/DL (ref 2–3.5)
GLUCOSE BLD-MCNC: 109 MG/DL (ref 70–99)
GLUCOSE UR STRIP.AUTO-MCNC: NEGATIVE MG/DL
HCT VFR BLD AUTO: 34.8 % (ref 35–48)
HGB BLD-MCNC: 12 G/DL (ref 12–16)
IMM GRANULOCYTES # BLD AUTO: 0.02 X10(3) UL (ref 0–1)
IMM GRANULOCYTES NFR BLD: 0.3 %
KETONES UR STRIP.AUTO-MCNC: NEGATIVE MG/DL
LEUKOCYTE ESTERASE UR QL STRIP.AUTO: NEGATIVE
LYMPHOCYTES # BLD AUTO: 1.19 X10(3) UL (ref 1–4)
LYMPHOCYTES NFR BLD AUTO: 16.4 %
MCH RBC QN AUTO: 30.7 PG (ref 26–34)
MCHC RBC AUTO-ENTMCNC: 34.5 G/DL (ref 31–37)
MCV RBC AUTO: 89 FL (ref 80–100)
MONOCYTES # BLD AUTO: 0.63 X10(3) UL (ref 0.1–1)
MONOCYTES NFR BLD AUTO: 8.7 %
NEUTROPHILS # BLD AUTO: 5.33 X10 (3) UL (ref 1.5–7.7)
NEUTROPHILS # BLD AUTO: 5.33 X10(3) UL (ref 1.5–7.7)
NEUTROPHILS NFR BLD AUTO: 73.2 %
NITRITE UR QL STRIP.AUTO: NEGATIVE
OSMOLALITY SERPL CALC.SUM OF ELEC: 283 MOSM/KG (ref 275–295)
PH UR STRIP.AUTO: 6 [PH] (ref 5–8)
PLATELET # BLD AUTO: 274 10(3)UL (ref 150–450)
POTASSIUM SERPL-SCNC: 3.8 MMOL/L (ref 3.5–5.1)
PROT SERPL-MCNC: 7.1 G/DL (ref 5.7–8.2)
PROT UR STRIP.AUTO-MCNC: NEGATIVE MG/DL
RBC # BLD AUTO: 3.91 X10(6)UL (ref 3.8–5.3)
RBC UR QL AUTO: NEGATIVE
SODIUM SERPL-SCNC: 136 MMOL/L (ref 136–145)
SP GR UR STRIP.AUTO: <=1.005 (ref 1–1.03)
UROBILINOGEN UR STRIP.AUTO-MCNC: 0.2 MG/DL
WBC # BLD AUTO: 7.3 X10(3) UL (ref 4–11)

## 2025-06-23 PROCEDURE — 99284 EMERGENCY DEPT VISIT MOD MDM: CPT

## 2025-06-23 PROCEDURE — 36415 COLL VENOUS BLD VENIPUNCTURE: CPT

## 2025-06-23 PROCEDURE — 74177 CT ABD & PELVIS W/CONTRAST: CPT | Performed by: EMERGENCY MEDICINE

## 2025-06-23 PROCEDURE — 71045 X-RAY EXAM CHEST 1 VIEW: CPT | Performed by: EMERGENCY MEDICINE

## 2025-06-23 PROCEDURE — 85025 COMPLETE CBC W/AUTO DIFF WBC: CPT | Performed by: EMERGENCY MEDICINE

## 2025-06-23 PROCEDURE — 80053 COMPREHEN METABOLIC PANEL: CPT | Performed by: EMERGENCY MEDICINE

## 2025-06-23 PROCEDURE — 81003 URINALYSIS AUTO W/O SCOPE: CPT | Performed by: EMERGENCY MEDICINE

## 2025-06-23 RX ORDER — METRONIDAZOLE 500 MG/1
500 TABLET ORAL ONCE
Status: COMPLETED | OUTPATIENT
Start: 2025-06-23 | End: 2025-06-23

## 2025-06-23 RX ORDER — SULFAMETHOXAZOLE AND TRIMETHOPRIM 800; 160 MG/1; MG/1
1 TABLET ORAL 2 TIMES DAILY
Qty: 20 TABLET | Refills: 0 | Status: SHIPPED | OUTPATIENT
Start: 2025-06-23 | End: 2025-07-03

## 2025-06-23 RX ORDER — SULFAMETHOXAZOLE AND TRIMETHOPRIM 800; 160 MG/1; MG/1
1 TABLET ORAL ONCE
Status: COMPLETED | OUTPATIENT
Start: 2025-06-23 | End: 2025-06-23

## 2025-06-23 RX ORDER — METRONIDAZOLE 500 MG/1
500 TABLET ORAL 3 TIMES DAILY
Qty: 30 TABLET | Refills: 0 | Status: SHIPPED | OUTPATIENT
Start: 2025-06-23 | End: 2025-07-03

## 2025-06-23 NOTE — ED INITIAL ASSESSMENT (HPI)
Pt c/o bilateral flank pain/abdominal pain since Wednesday.  No pain with urination.  No vomiting or diarrhea.

## 2025-06-23 NOTE — ED PROVIDER NOTES
Patient Seen in: [facility] [place]        History  Chief Complaint   Patient presents with    Abdomen/Flank Pain     Stated Complaint: abdominal pain/bilateral flank pain    Subjective:   HPI            Chills fatigue, lower abdominal pain rating to left flank.  No GI symptoms.  No urinary symptoms.  No history of the same.      Objective:     Past Medical History:    Arthritis    Asthma (HCC)    seasonal    COVID    Esophageal reflux    Extrinsic asthma, unspecified    Hiatal hernia    High blood pressure    Unspecified essential hypertension              Past Surgical History:   Procedure Laterality Date    Appendectomy      Benign biopsy right  05/2018    fibroadenoma    Colonoscopy N/A 07/25/2019    Procedure: COLONOSCOPY, POSSIBLE BIOPSY, POSSIBLE POLYPECTOMY 47550;  Surgeon: Sarah Mccain MD;  Location: [facility]    Colonoscopy      Eye surgery      Hernia surgery  02/11/2022    robotic ventral hernia repair w/ mesh    Hip replacement surgery      Other surgical history      Right breast biopsy    Removal of ovary/tube(s)                  Social History     Socioeconomic History    Marital status:    Tobacco Use    Smoking status: Former    Smokeless tobacco: Never    Tobacco comments:     quit 1985   Vaping Use    Vaping status: Never Used   Substance and Sexual Activity    Alcohol use: Not Currently     Alcohol/week: 1.0 standard drink of alcohol     Types: 1 Glasses of wine per week     Comment: 1x per week    Drug use: No                                Physical Exam    ED Triage Vitals [06/23/25 1547]   /71   Pulse 83   Resp 18   Temp 98.3 °F (36.8 °C)   Temp src Temporal   SpO2 95 %   O2 Device None (Room air)       Current Vitals:   Vital Signs  BP: 142/68  Pulse: 81  Resp: 16  Temp: 98.3 °F (36.8 °C)  Temp src: Temporal    Oxygen Therapy  SpO2: 96 %  O2 Device: None (Room air)            Physical Exam    Physical Exam   Constitutional: Awake, alert,  well appearing  Head: Normocephalic and atraumatic.   Eyes: Conjunctivae are normal. Pupils are equal, round, and reactive to light.   Neck: Normal range of motion. No JVD  Cardiovascular: Normal rate, regular rhythm  Pulmonary/Chest: Normal effort.  No accessory muscle use.  No cyanosis.  Abdominal: Soft. Not distended.  Neurological: Pt is alert and oriented to person, place, and time. no cranial nerve deficits. Speech fluent      Belly benign multiple exams no CVA tenderness        ED Course  Labs Reviewed   CBC WITH DIFFERENTIAL WITH PLATELET - Abnormal; Notable for the following components:       Result Value    HCT 34.8 (*)     All other components within normal limits   COMP METABOLIC PANEL (14) - Abnormal; Notable for the following components:    Glucose 109 (*)     All other components within normal limits   URINALYSIS WITH CULTURE REFLEX            CT ABDOMEN+PELVIS(CONTRAST ONLY)(CPT=74177)  Result Date: 6/23/2025  CONCLUSION:  Findings of acute sigmoid diverticulitis without evidence of diverticular perforation or regional abscess formation.   LOCATION:  [id number]   Dictated by (CST): Adán Cee MD on 6/23/2025 at 6:03 PM     Finalized by (CST): Adán Cee MD on 6/23/2025 at 6:07 PM       XR CHEST AP PORTABLE  (CPT=71045)  Result Date: 6/23/2025  CONCLUSION:  Heart size within normal limits.  There may be a small hiatal hernia.  No pulmonary edema or focal airspace consolidation.  The pleural spaces are clear.   LOCATION:  [id number]      Dictated by (CST): Adán Cee MD on 6/23/2025 at 5:09 PM     Finalized by (CST): Adán Cee MD on 6/23/2025 at 5:09 PM           Labs fine urine clean                  MDM           Differential diagnoses considered: Diverticulitis, pyelonephritis, bacteremia, pneumonia, UTI all considered    -Only source of infection found is diverticulitis.  - Tolerating p.o. belly benign labs okay good candidate for outpatient treatment    -Bactrim Flagyl outpatient follow-up with PCP  and/or GI      I visualized the radiology studies, my independent interpretation: No free air noted on CT scan abdomen pelvis        Shared decision making was done by: patient, myself.          Medical Decision Making      Disposition and Plan     Clinical Impression:  1. Acute diverticulitis         Disposition:  Discharge  6/23/2025  6:33 pm    Follow-up:  No follow-up provider specified.        Medications Prescribed:  Current Discharge Medication List        START taking these medications    Details   sulfamethoxazole-trimethoprim -160 MG Oral Tab per tablet Take 1 tablet by mouth 2 (two) times daily for 10 days.  Qty: 20 tablet, Refills: 0      metroNIDAZOLE 500 MG Oral Tab Take 1 tablet (500 mg total) by mouth 3 (three) times daily for 10 days.  Qty: 30 tablet, Refills: 0                   Supplementary Documentation:

## (undated) DEVICE — REM POLYHESIVE ADULT PATIENT RETURN ELECTRODE: Brand: VALLEYLAB

## (undated) DEVICE — SUTURE SILK 2-0

## (undated) DEVICE — GOWN,SIRUS,FABRIC-REINFORCED,X-LARGE: Brand: MEDLINE

## (undated) DEVICE — STANDARD HYPODERMIC NEEDLE,POLYPROPYLENE HUB: Brand: MONOJECT

## (undated) DEVICE — STERILE SYNTHETIC POLYISOPRENE POWDER-FREE SURGICAL GLOVES WITH HYDROGEL COATING, SMOOTH FINISH, STRAIGHT FINGER: Brand: PROTEXIS

## (undated) DEVICE — DERMABOND LIQUID ADHESIVE

## (undated) DEVICE — SYRINGE 20CC LL TIP

## (undated) DEVICE — DRESSING AQUACEL AG 3.5X12

## (undated) DEVICE — VIOLET BRAIDED (POLYGLACTIN 910), SYNTHETIC ABSORBABLE SUTURE: Brand: COATED VICRYL

## (undated) DEVICE — TOWEL OR BLU 16X26 STRL

## (undated) DEVICE — PROXIMATE SKIN STAPLERS (35 WIDE) CONTAINS 35 STAINLESS STEEL STAPLES (FIXED HEAD): Brand: PROXIMATE

## (undated) DEVICE — CHLORAPREP 26ML APPLICATOR

## (undated) DEVICE — SUTURE SILK 3-0 SH

## (undated) DEVICE — LIGHT HANDLE

## (undated) DEVICE — LAPAROTOMY SPONGE - RF AND X-RAY DETECTABLE PRE-WASHED: Brand: SITUATE

## (undated) DEVICE — LAPAROTOMY CDS: Brand: MEDLINE INDUSTRIES, INC.

## (undated) DEVICE — KENDALL SCD EXPRESS SLEEVES, KNEE LENGTH, MEDIUM: Brand: KENDALL SCD

## (undated) DEVICE — SOL  .9 1000ML BTL

## (undated) DEVICE — SUTURE SILK 0

## (undated) DEVICE — SUTURE MONOCRYL 4-0 PS-2

## (undated) DEVICE — POOLE SUCTION HANDLE: Brand: CARDINAL HEALTH

## (undated) DEVICE — CLOSING BUNDLE: Brand: MEDLINE INDUSTRIES, INC.

## (undated) NOTE — LETTER
Benita Adhikari [address]  [address], [address] [facility]  Authorization for Surgical Operation and Procedure     Date:___________                                                                                                         Time:__________ 4.   Should the need arise during my operation or immediate post-operative period, I also consent to the administration of blood and/or blood products.   Further, I understand that despite careful testing and screening of blood or blood products by cassandra 8.   I recognize that in the event my procedure results in extended X-Ray/fluoroscopy time, I may develop a skin reaction. 9.  If I have a Do Not Attempt Resuscitation (DNAR) order in place, that status will be suspended while in the operating room, proc 1. IAlfredo agree to be cared for by an anesthesiologist, who is specially trained to monitor me and give me medicine to put me to sleep or keep me comfortable during my procedure    I understand that my anesthesiologist is not an employee or a 5. My doctor has explained to me other choices available to me for my care (alternatives).   6. Pregnant Patients (“epidural”):  I understand that the risks of having an epidural (medicine given into my back to help control pain during labor), include itchi